# Patient Record
Sex: FEMALE | Race: WHITE | NOT HISPANIC OR LATINO | Employment: FULL TIME | URBAN - METROPOLITAN AREA
[De-identification: names, ages, dates, MRNs, and addresses within clinical notes are randomized per-mention and may not be internally consistent; named-entity substitution may affect disease eponyms.]

---

## 2022-06-22 ENCOUNTER — APPOINTMENT (OUTPATIENT)
Dept: LAB | Facility: CLINIC | Age: 22
End: 2022-06-22

## 2022-06-22 DIAGNOSIS — Z02.1 PRE-EMPLOYMENT EXAMINATION: ICD-10-CM

## 2022-06-22 LAB — RUBV IGG SERPL IA-ACNC: 171 IU/ML

## 2022-06-22 PROCEDURE — 86762 RUBELLA ANTIBODY: CPT

## 2022-06-22 PROCEDURE — 86787 VARICELLA-ZOSTER ANTIBODY: CPT

## 2022-06-22 PROCEDURE — 86735 MUMPS ANTIBODY: CPT

## 2022-06-22 PROCEDURE — 86765 RUBEOLA ANTIBODY: CPT

## 2022-06-22 PROCEDURE — 36415 COLL VENOUS BLD VENIPUNCTURE: CPT

## 2022-06-22 PROCEDURE — 86480 TB TEST CELL IMMUN MEASURE: CPT

## 2022-06-23 LAB
GAMMA INTERFERON BACKGROUND BLD IA-ACNC: 0.04 IU/ML
M TB IFN-G BLD-IMP: NEGATIVE
M TB IFN-G CD4+ BCKGRND COR BLD-ACNC: 0 IU/ML
M TB IFN-G CD4+ BCKGRND COR BLD-ACNC: 0 IU/ML
MEV IGG SER QL: NORMAL
MITOGEN IGNF BCKGRD COR BLD-ACNC: >10 IU/ML
MUV IGG SER QL: NORMAL
VZV IGG SER IA-ACNC: NORMAL

## 2022-09-18 ENCOUNTER — APPOINTMENT (EMERGENCY)
Dept: CT IMAGING | Facility: HOSPITAL | Age: 22
End: 2022-09-18
Payer: COMMERCIAL

## 2022-09-18 ENCOUNTER — HOSPITAL ENCOUNTER (EMERGENCY)
Facility: HOSPITAL | Age: 22
Discharge: HOME | End: 2022-09-18
Attending: EMERGENCY MEDICINE | Admitting: EMERGENCY MEDICINE
Payer: COMMERCIAL

## 2022-09-18 VITALS
HEIGHT: 62 IN | OXYGEN SATURATION: 100 % | DIASTOLIC BLOOD PRESSURE: 64 MMHG | BODY MASS INDEX: 22.08 KG/M2 | SYSTOLIC BLOOD PRESSURE: 118 MMHG | WEIGHT: 120 LBS | HEART RATE: 77 BPM | RESPIRATION RATE: 17 BRPM | TEMPERATURE: 98.8 F

## 2022-09-18 DIAGNOSIS — R10.84 GENERALIZED ABDOMINAL PAIN: Primary | ICD-10-CM

## 2022-09-18 LAB
ALBUMIN SERPL BCP-MCNC: 4.5 G/DL (ref 3.5–5)
ALP SERPL-CCNC: 45 U/L (ref 46–116)
ALT SERPL W P-5'-P-CCNC: 12 U/L (ref 12–78)
ANION GAP SERPL CALCULATED.3IONS-SCNC: 8 MMOL/L (ref 4–13)
AST SERPL W P-5'-P-CCNC: 19 U/L (ref 5–45)
BACTERIA UR QL AUTO: ABNORMAL /HPF
BASOPHILS # BLD AUTO: 0.03 THOUSANDS/ΜL (ref 0–0.1)
BASOPHILS NFR BLD AUTO: 1 % (ref 0–1)
BILIRUB SERPL-MCNC: 0.55 MG/DL (ref 0.2–1)
BILIRUB UR QL STRIP: NEGATIVE
BUN SERPL-MCNC: 16 MG/DL (ref 5–25)
CALCIUM SERPL-MCNC: 9.1 MG/DL (ref 8.3–10.1)
CHLORIDE SERPL-SCNC: 103 MMOL/L (ref 96–108)
CLARITY UR: ABNORMAL
CO2 SERPL-SCNC: 25 MMOL/L (ref 21–32)
COLOR UR: YELLOW
CREAT SERPL-MCNC: 0.94 MG/DL (ref 0.6–1.3)
EOSINOPHIL # BLD AUTO: 0.18 THOUSAND/ΜL (ref 0–0.61)
EOSINOPHIL NFR BLD AUTO: 3 % (ref 0–6)
ERYTHROCYTE [DISTWIDTH] IN BLOOD BY AUTOMATED COUNT: 11.8 % (ref 11.6–15.1)
EXT PREG TEST URINE: NEGATIVE
EXT. CONTROL ED NAV: NORMAL
GFR SERPL CREATININE-BSD FRML MDRD: 86 ML/MIN/1.73SQ M
GLUCOSE SERPL-MCNC: 90 MG/DL (ref 65–140)
GLUCOSE UR STRIP-MCNC: NEGATIVE MG/DL
HCT VFR BLD AUTO: 40.7 % (ref 34.8–46.1)
HGB BLD-MCNC: 13.7 G/DL (ref 11.5–15.4)
HGB UR QL STRIP.AUTO: NEGATIVE
IMM GRANULOCYTES # BLD AUTO: 0.03 THOUSAND/UL (ref 0–0.2)
IMM GRANULOCYTES NFR BLD AUTO: 1 % (ref 0–2)
KETONES UR STRIP-MCNC: ABNORMAL MG/DL
LACTATE SERPL-SCNC: 1.1 MMOL/L (ref 0.5–2)
LEUKOCYTE ESTERASE UR QL STRIP: ABNORMAL
LIPASE SERPL-CCNC: 80 U/L (ref 73–393)
LYMPHOCYTES # BLD AUTO: 2.13 THOUSANDS/ΜL (ref 0.6–4.47)
LYMPHOCYTES NFR BLD AUTO: 37 % (ref 14–44)
MCH RBC QN AUTO: 31 PG (ref 26.8–34.3)
MCHC RBC AUTO-ENTMCNC: 33.7 G/DL (ref 31.4–37.4)
MCV RBC AUTO: 92 FL (ref 82–98)
MONOCYTES # BLD AUTO: 0.56 THOUSAND/ΜL (ref 0.17–1.22)
MONOCYTES NFR BLD AUTO: 10 % (ref 4–12)
NEUTROPHILS # BLD AUTO: 2.78 THOUSANDS/ΜL (ref 1.85–7.62)
NEUTS SEG NFR BLD AUTO: 48 % (ref 43–75)
NITRITE UR QL STRIP: NEGATIVE
NON-SQ EPI CELLS URNS QL MICRO: ABNORMAL /HPF
NRBC BLD AUTO-RTO: 0 /100 WBCS
PH UR STRIP.AUTO: 6 [PH]
PLATELET # BLD AUTO: 269 THOUSANDS/UL (ref 149–390)
PMV BLD AUTO: 9.5 FL (ref 8.9–12.7)
POTASSIUM SERPL-SCNC: 3.7 MMOL/L (ref 3.5–5.3)
PROT SERPL-MCNC: 7.7 G/DL (ref 6.4–8.4)
PROT UR STRIP-MCNC: NEGATIVE MG/DL
RBC # BLD AUTO: 4.42 MILLION/UL (ref 3.81–5.12)
RBC #/AREA URNS AUTO: ABNORMAL /HPF
SODIUM SERPL-SCNC: 136 MMOL/L (ref 135–147)
SP GR UR STRIP.AUTO: >=1.03 (ref 1–1.03)
UROBILINOGEN UR QL STRIP.AUTO: 0.2 E.U./DL
WBC # BLD AUTO: 5.71 THOUSAND/UL (ref 4.31–10.16)
WBC #/AREA URNS AUTO: ABNORMAL /HPF

## 2022-09-18 PROCEDURE — 85025 COMPLETE CBC W/AUTO DIFF WBC: CPT | Performed by: EMERGENCY MEDICINE

## 2022-09-18 PROCEDURE — G1004 CDSM NDSC: HCPCS

## 2022-09-18 PROCEDURE — 81025 URINE PREGNANCY TEST: CPT | Performed by: EMERGENCY MEDICINE

## 2022-09-18 PROCEDURE — 36415 COLL VENOUS BLD VENIPUNCTURE: CPT | Performed by: EMERGENCY MEDICINE

## 2022-09-18 PROCEDURE — 81001 URINALYSIS AUTO W/SCOPE: CPT | Performed by: EMERGENCY MEDICINE

## 2022-09-18 PROCEDURE — 83605 ASSAY OF LACTIC ACID: CPT | Performed by: EMERGENCY MEDICINE

## 2022-09-18 PROCEDURE — 99284 EMERGENCY DEPT VISIT MOD MDM: CPT

## 2022-09-18 PROCEDURE — 99284 EMERGENCY DEPT VISIT MOD MDM: CPT | Performed by: EMERGENCY MEDICINE

## 2022-09-18 PROCEDURE — 83690 ASSAY OF LIPASE: CPT | Performed by: EMERGENCY MEDICINE

## 2022-09-18 PROCEDURE — 80053 COMPREHEN METABOLIC PANEL: CPT | Performed by: EMERGENCY MEDICINE

## 2022-09-18 PROCEDURE — 96360 HYDRATION IV INFUSION INIT: CPT

## 2022-09-18 PROCEDURE — 87086 URINE CULTURE/COLONY COUNT: CPT | Performed by: EMERGENCY MEDICINE

## 2022-09-18 PROCEDURE — 74177 CT ABD & PELVIS W/CONTRAST: CPT

## 2022-09-18 RX ORDER — CEPHALEXIN 250 MG/1
500 CAPSULE ORAL ONCE
Status: COMPLETED | OUTPATIENT
Start: 2022-09-18 | End: 2022-09-18

## 2022-09-18 RX ORDER — MEDROXYPROGESTERONE ACETATE 150 MG/ML
INJECTION, SUSPENSION INTRAMUSCULAR
COMMUNITY
Start: 2022-04-04

## 2022-09-18 RX ORDER — PANTOPRAZOLE SODIUM 20 MG/1
20 TABLET, DELAYED RELEASE ORAL DAILY
Qty: 20 TABLET | Refills: 0 | Status: SHIPPED | OUTPATIENT
Start: 2022-09-18

## 2022-09-18 RX ORDER — FAMOTIDINE 20 MG/1
20 TABLET, FILM COATED ORAL ONCE
Status: COMPLETED | OUTPATIENT
Start: 2022-09-18 | End: 2022-09-18

## 2022-09-18 RX ORDER — CEPHALEXIN 500 MG/1
500 CAPSULE ORAL EVERY 6 HOURS SCHEDULED
Qty: 20 CAPSULE | Refills: 0 | Status: SHIPPED | OUTPATIENT
Start: 2022-09-18 | End: 2022-09-23

## 2022-09-18 RX ADMIN — SODIUM CHLORIDE 1000 ML: 0.9 INJECTION, SOLUTION INTRAVENOUS at 14:27

## 2022-09-18 RX ADMIN — CEPHALEXIN 500 MG: 250 CAPSULE ORAL at 15:49

## 2022-09-18 RX ADMIN — FAMOTIDINE 20 MG: 20 TABLET ORAL at 15:49

## 2022-09-18 RX ADMIN — IOHEXOL 90 ML: 350 INJECTION, SOLUTION INTRAVENOUS at 15:09

## 2022-09-18 NOTE — ED PROVIDER NOTES
History  Chief Complaint   Patient presents with    Abdominal Pain     Pt c/o continuous abdominal pain w/ low grade fevers, worsens after eating for past week  Denies travel/sob/cough/n/v/d     Patient been having fairly consistent mid abdominal pain for the past 1 week  Had low-grade fevers on Saturday and the other day  No fevers now  No nausea or vomiting  Decreased appetite  Decreased p o  Intake  No dysuria frequency  Patient is on Depo  Bowel movements have been smaller in amount  No recent cough or cold symptoms  No dysuria frequency  Nothing taken for the pain  No radiation of the pain  Worse after eating  Does take a lot of Advil  History provided by:  Patient   used: No    Abdominal Pain  Pain location:  Generalized  Pain quality: aching    Pain radiates to:  Does not radiate  Pain severity:  Mild  Onset quality:  Gradual  Duration:  1 week  Timing:  Constant  Progression:  Unchanged  Chronicity:  New  Context: not awakening from sleep, not recent sexual activity and not sick contacts    Relieved by:  Nothing  Worsened by:  Eating  Ineffective treatments:  None tried  Associated symptoms: anorexia and fever    Associated symptoms: no chest pain, no chills, no constipation, no cough, no diarrhea, no dysuria, no hematuria, no nausea, no shortness of breath, no sore throat and no vomiting        Prior to Admission Medications   Prescriptions Last Dose Informant Patient Reported? Taking? medroxyPROGESTERone (DEPO-PROVERA) 150 mg/mL injection   Yes Yes      Facility-Administered Medications: None       History reviewed  No pertinent past medical history  History reviewed  No pertinent surgical history  History reviewed  No pertinent family history  I have reviewed and agree with the history as documented      E-Cigarette/Vaping    E-Cigarette Use Never User      E-Cigarette/Vaping Substances     Social History     Tobacco Use    Smoking status: Never Smoker    Smokeless tobacco: Never Used   Vaping Use    Vaping Use: Never used   Substance Use Topics    Alcohol use: Not Currently    Drug use: Never       Review of Systems   Constitutional: Positive for fever  Negative for chills  HENT: Negative for ear pain, hearing loss, sore throat, trouble swallowing and voice change  Eyes: Negative for pain and discharge  Respiratory: Negative for cough, shortness of breath and wheezing  Cardiovascular: Negative for chest pain and palpitations  Gastrointestinal: Positive for abdominal pain and anorexia  Negative for blood in stool, constipation, diarrhea, nausea and vomiting  Genitourinary: Negative for dysuria, flank pain, frequency and hematuria  Musculoskeletal: Negative for joint swelling, neck pain and neck stiffness  Skin: Negative for rash and wound  Neurological: Negative for dizziness, seizures, syncope, facial asymmetry and headaches  Psychiatric/Behavioral: Negative for hallucinations, self-injury and suicidal ideas  All other systems reviewed and are negative  Physical Exam  Physical Exam  Vitals and nursing note reviewed  Constitutional:       General: She is not in acute distress  Appearance: She is well-developed  HENT:      Head: Normocephalic and atraumatic  Right Ear: External ear normal       Left Ear: External ear normal    Eyes:      General: No scleral icterus  Right eye: No discharge  Left eye: No discharge  Extraocular Movements: Extraocular movements intact  Conjunctiva/sclera: Conjunctivae normal    Cardiovascular:      Rate and Rhythm: Normal rate and regular rhythm  Heart sounds: Normal heart sounds  No murmur heard  Pulmonary:      Effort: Pulmonary effort is normal       Breath sounds: Normal breath sounds  No wheezing or rales  Abdominal:      General: Bowel sounds are normal  There is no distension  Palpations: Abdomen is soft  Tenderness:  There is abdominal tenderness (Minimal tenderness in the mid epigastric/periumbilical region  )  There is no guarding or rebound  Musculoskeletal:         General: No deformity  Normal range of motion  Cervical back: Normal range of motion and neck supple  Skin:     General: Skin is warm and dry  Findings: No rash  Neurological:      General: No focal deficit present  Mental Status: She is alert and oriented to person, place, and time  Cranial Nerves: No cranial nerve deficit  Psychiatric:         Mood and Affect: Mood normal          Behavior: Behavior normal          Thought Content: Thought content normal          Judgment: Judgment normal          Vital Signs  ED Triage Vitals [09/18/22 1358]   Temperature Pulse Respirations Blood Pressure SpO2   98 8 °F (37 1 °C) 97 17 142/85 99 %      Temp Source Heart Rate Source Patient Position - Orthostatic VS BP Location FiO2 (%)   Temporal Monitor Sitting Right arm --      Pain Score       3           Vitals:    09/18/22 1358 09/18/22 1445   BP: 142/85 118/64   Pulse: 97 77   Patient Position - Orthostatic VS: Sitting          Visual Acuity      ED Medications  Medications   cephalexin (KEFLEX) capsule 500 mg (has no administration in time range)   famotidine (PEPCID) tablet 20 mg (has no administration in time range)   sodium chloride 0 9 % bolus 1,000 mL (1,000 mL Intravenous New Bag 9/18/22 1427)   iohexol (OMNIPAQUE) 350 MG/ML injection (SINGLE-DOSE) 90 mL (90 mL Intravenous Given 9/18/22 1509)       Diagnostic Studies  Results Reviewed     Procedure Component Value Units Date/Time    Lactic acid [[id number]]  (Normal) Collected: 09/18/22 1425    Lab Status: Final result Specimen: Blood from Line, Venous Updated: 09/18/22 1505     LACTIC ACID 1 1 mmol/L     Narrative:      Result may be elevated if tourniquet was used during collection      Urine Microscopic [[id number]]  (Abnormal) Collected: 09/18/22 1426    Lab Status: Final result Specimen: Urine, Clean Catch Updated: 09/18/22 1505     RBC, UA None Seen /hpf      WBC, UA 10-20 /hpf      Epithelial Cells Moderate /hpf      Bacteria, UA Moderate /hpf     Urine culture [[id number]] Collected: 09/18/22 1426    Lab Status:  In process Specimen: Urine, Clean Catch Updated: 09/18/22 1504    Comprehensive metabolic panel [[id number]]  (Abnormal) Collected: 09/18/22 1425    Lab Status: Final result Specimen: Blood from Line, Venous Updated: 09/18/22 1456     Sodium 136 mmol/L      Potassium 3 7 mmol/L      Chloride 103 mmol/L      CO2 25 mmol/L      ANION GAP 8 mmol/L      BUN 16 mg/dL      Creatinine 0 94 mg/dL      Glucose 90 mg/dL      Calcium 9 1 mg/dL      AST 19 U/L      ALT 12 U/L      Alkaline Phosphatase 45 U/L      Total Protein 7 7 g/dL      Albumin 4 5 g/dL      Total Bilirubin 0 55 mg/dL      eGFR 86 ml/min/1 73sq m     Narrative:      [place] guidelines for Chronic Kidney Disease (CKD):     Stage 1 with normal or high GFR (GFR > 90 mL/min/1 73 square meters)    Stage 2 Mild CKD (GFR = 60-89 mL/min/1 73 square meters)    Stage 3A Moderate CKD (GFR = 45-59 mL/min/1 73 square meters)    Stage 3B Moderate CKD (GFR = 30-44 mL/min/1 73 square meters)    Stage 4 Severe CKD (GFR = 15-29 mL/min/1 73 square meters)    Stage 5 End Stage CKD (GFR <15 mL/min/1 73 square meters)  Note: GFR calculation is accurate only with a steady state creatinine    Lipase [[id number]]  (Normal) Collected: 09/18/22 1425    Lab Status: Final result Specimen: Blood from Line, Venous Updated: 09/18/22 1456     Lipase 80 u/L     UA w Reflex to Microscopic w Reflex to Culture [[id number]]  (Abnormal) Collected: 09/18/22 1426    Lab Status: Final result Specimen: Urine, Clean Catch Updated: 09/18/22 1448     Color, UA Yellow     Clarity, UA Slightly Cloudy     Specific Gravity, UA >=1 030     pH, UA 6 0     Leukocytes, UA Trace     Nitrite, UA Negative     Protein, UA Negative mg/dl      Glucose, UA Negative mg/dl Ketones, UA Trace mg/dl      Urobilinogen, UA 0 2 E U /dl      Bilirubin, UA Negative     Occult Blood, UA Negative    CBC and differential [[id number]] Collected: 09/18/22 1425    Lab Status: Final result Specimen: Blood from Line, Venous Updated: 09/18/22 1437     WBC 5 71 Thousand/uL      RBC 4 42 Million/uL      Hemoglobin 13 7 g/dL      Hematocrit 40 7 %      MCV 92 fL      MCH 31 0 pg      MCHC 33 7 g/dL      RDW 11 8 %      MPV 9 5 fL      Platelets 650 Thousands/uL      nRBC 0 /100 WBCs      Neutrophils Relative 48 %      Immat GRANS % 1 %      Lymphocytes Relative 37 %      Monocytes Relative 10 %      Eosinophils Relative 3 %      Basophils Relative 1 %      Neutrophils Absolute 2 78 Thousands/µL      Immature Grans Absolute 0 03 Thousand/uL      Lymphocytes Absolute 2 13 Thousands/µL      Monocytes Absolute 0 56 Thousand/µL      Eosinophils Absolute 0 18 Thousand/µL      Basophils Absolute 0 03 Thousands/µL     POCT pregnancy, urine [[id number]]  (Normal) Resulted: 09/18/22 1427    Lab Status: Final result Updated: 09/18/22 1428     EXT PREG TEST UR (Ref: Negative) NEGATIVE     Control VALID                 CT abdomen pelvis with contrast   Final Result by Halbert Kawasaki, MD (09/18 1531)      Mural thickening of the duodenum and proximal jejunum, consistent with enteritis        Otherwise unremarkable CT of the abdomen and pelvis            Workstation performed: [id number]                    Procedures  Procedures         ED Course                                             MDM  Number of Diagnoses or Management Options     Amount and/or Complexity of Data Reviewed  Clinical lab tests: reviewed  Review and summarize past medical records: yes        Disposition  Final diagnoses:   Generalized abdominal pain     Time reflects when diagnosis was documented in both MDM as applicable and the Disposition within this note     Time User Action Codes Description Comment    9/18/2022  3:36 PM Daryl Maribel Benton Add [I41 03] Generalized abdominal pain       ED Disposition     ED Disposition   Discharge    Condition   Stable    Date/Time   Sun Sep 18, 2022  3:36 PM    Comment   Denys Encinas discharge to home/self care  Follow-up Information    None         Patient's Medications   Discharge Prescriptions    CEPHALEXIN (KEFLEX) 500 MG CAPSULE    Take 1 capsule (500 mg total) by mouth every 6 (six) hours for 5 days       Start Date: 9/18/2022 End Date: 9/23/2022       Order Dose: 500 mg       Quantity: 20 capsule    Refills: 0    PANTOPRAZOLE (PROTONIX) 20 MG TABLET    Take 1 tablet (20 mg total) by mouth daily       Start Date: 9/18/2022 End Date: --       Order Dose: 20 mg       Quantity: 20 tablet    Refills: 0       No discharge procedures on file      PDMP Review     None          ED Provider  Electronically Signed by           Macarena Go MD  09/18/22 350 [id number]

## 2022-09-20 LAB — BACTERIA UR CULT: NORMAL

## 2022-10-15 ENCOUNTER — OFFICE VISIT (OUTPATIENT)
Dept: URGENT CARE | Facility: CLINIC | Age: 22
End: 2022-10-15
Payer: COMMERCIAL

## 2022-10-15 VITALS
DIASTOLIC BLOOD PRESSURE: 66 MMHG | SYSTOLIC BLOOD PRESSURE: 106 MMHG | OXYGEN SATURATION: 98 % | BODY MASS INDEX: 21.35 KG/M2 | RESPIRATION RATE: 16 BRPM | TEMPERATURE: 98 F | WEIGHT: 116 LBS | HEIGHT: 62 IN | HEART RATE: 88 BPM

## 2022-10-15 DIAGNOSIS — R05.1 ACUTE COUGH: Primary | ICD-10-CM

## 2022-10-15 LAB
S PYO AG THROAT QL: NEGATIVE
SARS-COV-2 AG UPPER RESP QL IA: NEGATIVE
VALID CONTROL: NORMAL

## 2022-10-15 PROCEDURE — 87880 STREP A ASSAY W/OPTIC: CPT | Performed by: NURSE PRACTITIONER

## 2022-10-15 PROCEDURE — 99213 OFFICE O/P EST LOW 20 MIN: CPT | Performed by: NURSE PRACTITIONER

## 2022-10-15 PROCEDURE — 87070 CULTURE OTHR SPECIMN AEROBIC: CPT | Performed by: NURSE PRACTITIONER

## 2022-10-15 PROCEDURE — 87811 SARS-COV-2 COVID19 W/OPTIC: CPT | Performed by: NURSE PRACTITIONER

## 2022-10-15 RX ORDER — BENZONATATE 200 MG/1
200 CAPSULE ORAL 3 TIMES DAILY PRN
Qty: 20 CAPSULE | Refills: 0 | Status: SHIPPED | OUTPATIENT
Start: 2022-10-15

## 2022-10-15 NOTE — PATIENT INSTRUCTIONS
Take zyrtec or allegra daily  Use flonase 1-2 sprays in each nare daily   Use nasal saline to the nose,   Use humidifer in room  Symptoms worsen go to ER  Rest      Rapid covid was negative  Follow up with pcp  Take meds as directed   Warm salt gargles  Increase fluids    Take zyrtec or allegra for symptoms along with flonase    Rapid strep was negative today and sent for a culture  Call in 2-3 days for throat culture results    Take tylenol and motrin for fever and pain     No bacterial infection noted

## 2022-10-15 NOTE — PROGRESS NOTES
NAME: Jeff Chavez is a 24 y o  female  : 2000    MRN: [id number]    /66   Pulse 88   Temp 98 °F (36 7 °C)   Resp 16   Ht 5' 2" (1 575 m)   Wt 52 6 kg (116 lb)   SpO2 98%   BMI 21 22 kg/m²     Assessment and Plan   Acute cough [R05 1]  1  Acute cough  Poct Covid 19 Rapid Antigen Test    POCT rapid strepA    Throat culture    benzonatate (TESSALON) 200 MG capsule       Jason Raya was seen today for cough  Diagnoses and all orders for this visit:    Acute cough  -     Poct Covid 19 Rapid Antigen Test  -     POCT rapid strepA  -     Throat culture  -     benzonatate (TESSALON) 200 MG capsule; Take 1 capsule (200 mg total) by mouth 3 (three) times a day as needed for cough     Rapid strep is negative, rapid covid is negative    Patient Instructions   Patient Instructions   Take zyrtec or allegra daily  Use flonase 1-2 sprays in each nare daily   Use nasal saline to the nose,   Use humidifer in room  Symptoms worsen go to ER  Rest      Rapid covid was negative  Follow up with pcp  Take meds as directed   Warm salt gargles  Increase fluids    Take zyrtec or allegra for symptoms along with flonase    Rapid strep was negative today and sent for a culture  Call in 2-3 days for throat culture results  Take tylenol and motrin for fever and pain     No bacterial infection noted       Proceed to the nearest ER if symptoms worsen, Follow up with your PCP  Continue to social distance, wash your hands, and wear your masks  Please continue to follow the CDC  gov guidelines daily for they are subject to change on COVID-19    Chief Complaint     Chief Complaint   Patient presents with   • Cough     Cough, sob, sore throat and stuffiness x 3 days         History of Present Illness     Pt has had cough and SOB along with sore throat and stuffy nose for the past three days  She is currently taking OTC meds with little relief, hasn't had home covid test today   She is covid 19 vaccinated and has covid in the past  She has no seasonal allergies that she is aware of  She has some ear discomfort intermittently  Review of Systems   Review of Systems   Constitutional: Negative for chills, fatigue and fever  HENT: Positive for congestion, postnasal drip, rhinorrhea, sinus pressure, sinus pain and sore throat  Eyes: Negative  Respiratory: Positive for cough  Cardiovascular: Negative  Gastrointestinal: Negative  Genitourinary: Negative  Musculoskeletal: Negative  Neurological: Negative  Psychiatric/Behavioral: Negative  Current Medications       Current Outpatient Medications:   •  benzonatate (TESSALON) 200 MG capsule, Take 1 capsule (200 mg total) by mouth 3 (three) times a day as needed for cough, Disp: 20 capsule, Rfl: 0  •  medroxyPROGESTERone (DEPO-PROVERA) 150 mg/mL injection, , Disp: , Rfl:   •  pantoprazole (PROTONIX) 20 mg tablet, Take 1 tablet (20 mg total) by mouth daily (Patient not taking: Reported on 10/15/2022), Disp: 20 tablet, Rfl: 0    Current Allergies     Allergies as of 10/15/2022   • (No Known Allergies)              Past Medical History:   Diagnosis Date   • Known health problems: none        Past Surgical History:   Procedure Laterality Date   • BUNIONECTOMY Bilateral        Family History   Problem Relation Age of Onset   • No Known Problems Mother    • No Known Problems Father          Medications have been verified  The following portions of the patient's history were reviewed and updated as appropriate: allergies, current medications, past family history, past medical history, past social history, past surgical history and problem list     Objective   /66   Pulse 88   Temp 98 °F (36 7 °C)   Resp 16   Ht 5' 2" (1 575 m)   Wt 52 6 kg (116 lb)   SpO2 98%   BMI 21 22 kg/m²      Physical Exam     Physical Exam  Constitutional:       General: She is not in acute distress  Appearance: Normal appearance  She is not ill-appearing     HENT:      Head: Normocephalic  Right Ear: Tympanic membrane, ear canal and external ear normal  There is no impacted cerumen  Left Ear: Tympanic membrane, ear canal and external ear normal  There is no impacted cerumen  Nose: Nose normal  No congestion or rhinorrhea  Mouth/Throat:      Mouth: Mucous membranes are moist       Pharynx: No oropharyngeal exudate or posterior oropharyngeal erythema  Eyes:      Pupils: Pupils are equal, round, and reactive to light  Cardiovascular:      Rate and Rhythm: Normal rate and regular rhythm  Pulses: Normal pulses  Heart sounds: Normal heart sounds  No murmur heard  Pulmonary:      Effort: Pulmonary effort is normal  No respiratory distress  Breath sounds: Normal breath sounds  No stridor  No wheezing or rales  Musculoskeletal:         General: Normal range of motion  Cervical back: Normal range of motion  Skin:     General: Skin is warm  Capillary Refill: Capillary refill takes less than 2 seconds  Neurological:      General: No focal deficit present  Mental Status: She is alert and oriented to person, place, and time  Psychiatric:         Mood and Affect: Mood normal                Note: Portions of this record may have been created with voice recognition software  Occasional wrong word or "sound a like" substitutions may have occurred due to the inherent limitations of voice recognition software  Please read the chart carefully and recognize, using context, where substitutions have occurred  Alondra Amado

## 2022-10-16 LAB — BACTERIA THROAT CULT: NORMAL

## 2022-10-18 LAB — BACTERIA THROAT CULT: NORMAL

## 2022-11-04 ENCOUNTER — APPOINTMENT (OUTPATIENT)
Dept: RADIOLOGY | Facility: CLINIC | Age: 22
End: 2022-11-04

## 2022-11-04 ENCOUNTER — OFFICE VISIT (OUTPATIENT)
Dept: FAMILY MEDICINE CLINIC | Facility: CLINIC | Age: 22
End: 2022-11-04

## 2022-11-04 VITALS
TEMPERATURE: 98.4 F | DIASTOLIC BLOOD PRESSURE: 60 MMHG | OXYGEN SATURATION: 98 % | BODY MASS INDEX: 21.75 KG/M2 | HEART RATE: 89 BPM | SYSTOLIC BLOOD PRESSURE: 90 MMHG | HEIGHT: 62 IN | WEIGHT: 118.2 LBS

## 2022-11-04 DIAGNOSIS — R11.0 NAUSEA: ICD-10-CM

## 2022-11-04 DIAGNOSIS — R10.12 LEFT UPPER QUADRANT ABDOMINAL PAIN: ICD-10-CM

## 2022-11-04 DIAGNOSIS — Z23 NEEDS FLU SHOT: ICD-10-CM

## 2022-11-04 DIAGNOSIS — R51.9 FREQUENT HEADACHES: ICD-10-CM

## 2022-11-04 DIAGNOSIS — K59.00 CONSTIPATION, UNSPECIFIED CONSTIPATION TYPE: ICD-10-CM

## 2022-11-04 DIAGNOSIS — G43.809 OTHER MIGRAINE WITHOUT STATUS MIGRAINOSUS, NOT INTRACTABLE: ICD-10-CM

## 2022-11-04 DIAGNOSIS — Z00.00 ANNUAL PHYSICAL EXAM: Primary | ICD-10-CM

## 2022-11-04 RX ORDER — PROPRANOLOL HYDROCHLORIDE 10 MG/1
10 TABLET ORAL DAILY
Qty: 30 TABLET | Refills: 0 | Status: SHIPPED | OUTPATIENT
Start: 2022-11-04

## 2022-11-04 RX ORDER — SUCRALFATE ORAL 1 G/10ML
1 SUSPENSION ORAL
Qty: 414 ML | Refills: 0 | Status: SHIPPED | OUTPATIENT
Start: 2022-11-04

## 2022-11-04 RX ORDER — HYDROXYZINE HYDROCHLORIDE 10 MG/1
TABLET, FILM COATED ORAL
COMMUNITY

## 2022-11-04 NOTE — PROGRESS NOTES
ADULT ANNUAL [facility] PRIMARY CARE    NAME: Fabiola Forbes  AGE: 24 y o  SEX: female  : 2000     DATE: 2022     Assessment and Plan:     Problem List Items Addressed This Visit    None     Visit Diagnoses     Annual physical exam    -  Primary    Relevant Orders    Comprehensive metabolic panel    Frequent headaches        Relevant Medications    propranolol (INDERAL) 10 mg tablet    Needs flu shot        Relevant Orders    influenza vaccine, quadrivalent, 0 5 mL, preservative-free, for adult and pediatric patients 6 mos+ (AFLURIA, FLUARIX, FLULAVAL, FLUZONE)    Other migraine without status migrainosus, not intractable        Relevant Medications    propranolol (INDERAL) 10 mg tablet    Nausea        Relevant Medications    sucralfate (CARAFATE) 1 g/10 mL suspension    Other Relevant Orders    Comprehensive metabolic panel    Left upper quadrant abdominal pain        Relevant Medications    sucralfate (CARAFATE) 1 g/10 mL suspension    Other Relevant Orders    Comprehensive metabolic panel    Constipation, unspecified constipation type        Relevant Orders    XR abdomen 1 view kub          Immunizations and preventive care screenings were discussed with patient today  Appropriate education was printed on patient's after visit summary  Counseling:  · Dental Health: discussed importance of regular tooth brushing, flossing, and dental visits  Return in about 4 weeks (around 2022)  Chief Complaint:     Chief Complaint   Patient presents with   • Physical Exam     New pt   • Headache     Daily headaches for years  • stomach  issues     Stomach ache after eating with nausea  • Constipation      History of Present Illness:     Adult Annual Physical   Patient here for a comprehensive physical exam  The patient reports problems - see additional note  Diet and Physical Activity  · Diet/Nutrition: well balanced diet  · Exercise: no formal exercise  Depression Screening  PHQ-2/9 Depression Screening    Little interest or pleasure in doing things: 0 - not at all  Feeling down, depressed, or hopeless: 0 - not at all       [id number] [facility]  · Sleep: sleeps well  · Hearing: normal - bilateral   · Vision: no vision problems  · Dental: regular dental visits  /GYN Health  · Last menstrual period: n/a - on depo injection  · Contraceptive method: injectable contraception  · History of STDs?: no      Review of Systems:     Review of Systems     Review of Systems   Constitutional: Negative for fever  Gastrointestinal: Positive for abdominal pain, anorexia, constipation, flatus and nausea  Negative for diarrhea, hematochezia, melena and vomiting  Genitourinary: Negative for dysuria, frequency and hematuria  Musculoskeletal: Negative for arthralgias and myalgias  Neurological: Positive for headaches          Past Medical History:     Past Medical History:   Diagnosis Date   • Known health problems: none       Past Surgical History:     Past Surgical History:   Procedure Laterality Date   • BUNIONECTOMY Bilateral       Social History:     Social History     Socioeconomic History   • Marital status: Single     Spouse name: None   • Number of children: None   • Years of education: None   • Highest education level: None   Occupational History   • None   Tobacco Use   • Smoking status: Never Smoker   • Smokeless tobacco: Never Used   Vaping Use   • Vaping Use: Never used   Substance and Sexual Activity   • Alcohol use: Not Currently     Comment: occational   • Drug use: Never   • Sexual activity: Not Currently   Other Topics Concern   • None   Social History Narrative   • None     Social Determinants of Health     Financial Resource Strain: Not on file   Food Insecurity: Not on file   Transportation Needs: Not on file   Physical Activity: Not on file   Stress: Not on file   Social Connections: Not on file   Intimate Partner Violence: Not on file   Housing Stability: Not on file      Family History:     Family History   Problem Relation Age of Onset   • No Known Problems Mother    • No Known Problems Father       Current Medications:     Current Outpatient Medications   Medication Sig Dispense Refill   • medroxyPROGESTERone (DEPO-PROVERA) 150 mg/mL injection      • propranolol (INDERAL) 10 mg tablet Take 1 tablet (10 mg total) by mouth in the morning 30 tablet 0   • sucralfate (CARAFATE) 1 g/10 mL suspension Take 10 mL (1 g total) by mouth 3 (three) times a day before meals 414 mL 0   • hydrOXYzine HCL (ATARAX) 10 mg tablet      • pantoprazole (PROTONIX) 20 mg tablet Take 1 tablet (20 mg total) by mouth daily (Patient not taking: No sig reported) 20 tablet 0     No current facility-administered medications for this visit  Allergies: Allergies   Allergen Reactions   • Shellfish-Derived Products - Food Allergy Other (See Comments)     Scratchy throat      Physical Exam:     BP 90/60 (BP Location: Left arm, Patient Position: Sitting, Cuff Size: Adult)   Pulse 89   Temp 98 4 °F (36 9 °C)   Ht 5' 2" (1 575 m)   Wt 53 6 kg (118 lb 3 2 oz)   SpO2 98%   BMI 21 62 kg/m²     Physical Exam       Physical Exam  Vitals and nursing note reviewed  Constitutional:       Appearance: Normal appearance  She is normal weight  HENT:      Head: Normocephalic and atraumatic  Right Ear: Tympanic membrane, ear canal and external ear normal       Left Ear: Tympanic membrane, ear canal and external ear normal    Eyes:      Extraocular Movements: Extraocular movements intact  Conjunctiva/sclera: Conjunctivae normal       Pupils: Pupils are equal, round, and reactive to light  Cardiovascular:      Rate and Rhythm: Normal rate and regular rhythm  Heart sounds: Normal heart sounds  No murmur heard  No gallop  Pulmonary:      Effort: Pulmonary effort is normal  No respiratory distress        Breath sounds: Normal breath sounds  No wheezing or rales  Abdominal:      General: Abdomen is flat  Bowel sounds are normal  There is no distension  Palpations: Abdomen is soft  Musculoskeletal:         General: Normal range of motion  Cervical back: Neck supple  Skin:     General: Skin is warm and dry  Neurological:      General: No focal deficit present  Mental Status: She is alert and oriented to person, place, and time  Mental status is at baseline  Cranial Nerves: No cranial nerve deficit  Psychiatric:         Mood and Affect: Mood normal          Behavior: Behavior normal          Thought Content: Thought content normal          Judgment: Judgment normal          [place] [address] [facility]  Answers for HPI/ROS submitted by the patient on 11/1/2022  Chronicity: recurrent  Onset: 1 to 4 weeks ago  Onset quality: sudden  Frequency: 2 to 4 times per day  Progression since onset: gradually worsening  Pain location: generalized abdominal region  Pain - numeric: 5/10  Pain quality: burning, cramping, dull  Radiates to: LUQ, back  anorexia: Yes  belching: Yes  flatus: Yes  hematochezia: No  melena:  No  weight loss: No  Aggravated by: certain positions, eating, movement  Relieved by: nothing  Diagnostic workup: CT scan

## 2022-11-04 NOTE — PATIENT INSTRUCTIONS

## 2022-11-04 NOTE — PROGRESS NOTES
Name: Murphy Solis      : 2000      MRN: [id number]  Encounter Provider: HARMAN Benton  Encounter Date: 2022   Encounter department: [address] [place] [address] PRIMARY CARE    Assessment & Plan     1  Annual physical exam  -     Comprehensive metabolic panel; Future    2  Frequent headaches  -     propranolol (INDERAL) 10 mg tablet; Take 1 tablet (10 mg total) by mouth in the morning    3  Needs flu shot  -     influenza vaccine, quadrivalent, 0 5 mL, preservative-free, for adult and pediatric patients 6 mos+ (AFLURIA, FLUARIX, FLULAVAL, FLUZONE)    4  Other migraine without status migrainosus, not intractable  -     propranolol (INDERAL) 10 mg tablet; Take 1 tablet (10 mg total) by mouth in the morning    5  Nausea  -     sucralfate (CARAFATE) 1 g/10 mL suspension; Take 10 mL (1 g total) by mouth 3 (three) times a day before meals  -     Comprehensive metabolic panel; Future    6  Left upper quadrant abdominal pain  -     sucralfate (CARAFATE) 1 g/10 mL suspension; Take 10 mL (1 g total) by mouth 3 (three) times a day before meals  -     Comprehensive metabolic panel; Future    7  Constipation, unspecified constipation type  -     XR abdomen 1 view kub; Future; Expected date: 2022    Will begin with daily medication for her headaches  She is with a hx of low BP so will try inderal as her heart rate is averaging in the 80's  Will begin with Carafate at meals to help with post-prandial nausea/vomiting  Abd XR ordered to assess constipation status  Review of films shows mild amount of stool - will have staff connect with her to recommend miralax daily with stool softener until results  Subjective      Here today to establish care - hx of headaches that occur daily for about 5 years  Hx of allergies - gets injections - and was told it was due to them  Reports she has taken OTC medication with no improvement    Notes headaches are daily and she reports they occur in different areas of her head at times  She takes Motrin with some relief  Reports about four times a month the headaches transition into migraines  Hx of vomiting after eating - occurring more often recently with recently starting with left upper quadrant pain  Also notes of constipation at intervals that has worsened in the past two weeks  Headache  Constipation  Associated symptoms include abdominal pain, anorexia, flatus and nausea  Pertinent negatives include no diarrhea, fever, hematochezia, melena or vomiting  Abdominal Pain  This is a recurrent problem  The current episode started 1 to 4 weeks ago  The onset quality is sudden  The problem occurs 2 to 4 times per day  The problem has been gradually worsening since onset  The pain is located in the generalized abdominal region  The pain is at a severity of 5/10  The quality of the pain is described as burning, cramping and dull  Associated symptoms include anorexia, belching, constipation, flatus, headaches and nausea  Pertinent negatives include no arthralgias, diarrhea, dysuria, fever, frequency, hematochezia, hematuria, melena, myalgias or vomiting  The symptoms are aggravated by certain positions, eating and movement  Nothing relieves the symptoms  Prior diagnostic workup includes CT scan  Review of Systems   Constitutional: Negative for fever  Gastrointestinal: Positive for abdominal pain, anorexia, constipation, flatus and nausea  Negative for diarrhea, hematochezia, melena and vomiting  Genitourinary: Negative for dysuria, frequency and hematuria  Musculoskeletal: Negative for arthralgias and myalgias  Neurological: Positive for headaches         Current Outpatient Medications on File Prior to Visit   Medication Sig   • medroxyPROGESTERone (DEPO-PROVERA) 150 mg/mL injection    • hydrOXYzine HCL (ATARAX) 10 mg tablet    • pantoprazole (PROTONIX) 20 mg tablet Take 1 tablet (20 mg total) by mouth daily (Patient not taking: No sig reported)   • [DISCONTINUED] benzonatate (TESSALON) 200 MG capsule Take 1 capsule (200 mg total) by mouth 3 (three) times a day as needed for cough (Patient not taking: Reported on 11/4/2022)       Objective     BP 90/60 (BP Location: Left arm, Patient Position: Sitting, Cuff Size: Adult)   Pulse 89   Temp 98 4 °F (36 9 °C)   Ht 5' 2" (1 575 m)   Wt 53 6 kg (118 lb 3 2 oz)   SpO2 98%   BMI 21 62 kg/m²     Physical Exam  Vitals and nursing note reviewed  Constitutional:       Appearance: Normal appearance  She is normal weight  HENT:      Head: Normocephalic and atraumatic  Right Ear: Tympanic membrane, ear canal and external ear normal       Left Ear: Tympanic membrane, ear canal and external ear normal    Eyes:      Extraocular Movements: Extraocular movements intact  Conjunctiva/sclera: Conjunctivae normal       Pupils: Pupils are equal, round, and reactive to light  Cardiovascular:      Rate and Rhythm: Normal rate and regular rhythm  Heart sounds: Normal heart sounds  No murmur heard  No gallop  Pulmonary:      Effort: Pulmonary effort is normal  No respiratory distress  Breath sounds: Normal breath sounds  No wheezing or rales  Abdominal:      General: Abdomen is flat  Bowel sounds are normal  There is no distension  Palpations: Abdomen is soft  Musculoskeletal:         General: Normal range of motion  Cervical back: Neck supple  Skin:     General: Skin is warm and dry  Neurological:      General: No focal deficit present  Mental Status: She is alert and oriented to person, place, and time  Mental status is at baseline  Cranial Nerves: No cranial nerve deficit  Psychiatric:         Mood and Affect: Mood normal          Behavior: Behavior normal          Thought Content:  Thought content normal          Judgment: Judgment normal        HARMAN Rogers  Answers for HPI/ROS submitted by the patient on 11/1/2022  Radiates to: LUQ, back  weight loss:  No

## 2022-11-23 ENCOUNTER — APPOINTMENT (OUTPATIENT)
Dept: LAB | Facility: CLINIC | Age: 22
End: 2022-11-23

## 2022-12-07 ENCOUNTER — OFFICE VISIT (OUTPATIENT)
Dept: URGENT CARE | Facility: CLINIC | Age: 22
End: 2022-12-07

## 2022-12-07 VITALS
BODY MASS INDEX: 21.49 KG/M2 | HEIGHT: 62 IN | TEMPERATURE: 97.7 F | WEIGHT: 116.8 LBS | RESPIRATION RATE: 18 BRPM | SYSTOLIC BLOOD PRESSURE: 131 MMHG | DIASTOLIC BLOOD PRESSURE: 83 MMHG | HEART RATE: 73 BPM | OXYGEN SATURATION: 99 %

## 2022-12-07 DIAGNOSIS — K59.00 CONSTIPATION, UNSPECIFIED CONSTIPATION TYPE: Primary | ICD-10-CM

## 2022-12-07 DIAGNOSIS — R11.0 NAUSEA: ICD-10-CM

## 2022-12-07 DIAGNOSIS — R10.30 LOWER ABDOMINAL PAIN: ICD-10-CM

## 2022-12-07 LAB
SL AMB  POCT GLUCOSE, UA: NEGATIVE
SL AMB LEUKOCYTE ESTERASE,UA: NEGATIVE
SL AMB POCT BILIRUBIN,UA: NEGATIVE
SL AMB POCT BLOOD,UA: NEGATIVE
SL AMB POCT CLARITY,UA: CLEAR
SL AMB POCT COLOR,UA: YELLOW
SL AMB POCT KETONES,UA: NEGATIVE
SL AMB POCT NITRITE,UA: NEGATIVE
SL AMB POCT PH,UA: 8
SL AMB POCT SPECIFIC GRAVITY,UA: 1
SL AMB POCT URINE HCG: NEGATIVE
SL AMB POCT URINE PROTEIN: NEGATIVE
SL AMB POCT UROBILINOGEN: NORMAL

## 2022-12-07 NOTE — PROGRESS NOTES
[facility]        NAME: Rachael Lemus is a 20 y o  female  : 2000    MRN: [id number]  DATE: 2022  TIME: 3:52 PM    Assessment and Plan   Constipation, unspecified constipation type [K59 00]  1  Constipation, unspecified constipation type        2  Lower abdominal pain  POCT urine dip    POCT urine HCG      3  Nausea              Patient Instructions   MiraLax  Laxative  Prune juice  Exercise  Increase fluid intake  Monitor symptoms  Follow up with PCP in 3-5 days  Proceed to  ER if symptoms worsen  Chief Complaint     Chief Complaint   Patient presents with   • Possible UTI     Patient reports lower back pain and lower abd pain with nausea for the past 2 days  History of Present Illness       Patient is a 23-year-old female with no significant past medical history presents the office complaining of bilateral low back pain, midline lower abdominal pain, and nausea for 2 days  Admits to urinary urgency but denies dysuria, urinary frequency, vaginal discharge, or hematuria  She did at home over-the-counter urinary tract infection test which showed a positive  States she is already being treated and monitored by her PCP for ongoing abdominal pain and nausea  She was placed on Carafate which has not helped  States she has not taken MiraLax because she has not had time to go to the store to get it  Reports she has a bowel movement approximately every 3 days that is hard  Reports she has a follow-up appointment with her PCP next week  Review of Systems   Review of Systems   Constitutional: Negative for chills and fever  Gastrointestinal: Positive for abdominal pain and nausea  Negative for diarrhea and vomiting  Genitourinary: Positive for urgency  Negative for decreased urine volume, difficulty urinating, dysuria, enuresis, flank pain, frequency, hematuria, pelvic pain, vaginal bleeding, vaginal discharge and vaginal pain     Musculoskeletal: Positive for back pain  Skin: Negative for rash  Current Medications       Current Outpatient Medications:   •  hydrOXYzine HCL (ATARAX) 10 mg tablet, , Disp: , Rfl:   •  medroxyPROGESTERone (DEPO-PROVERA) 150 mg/mL injection, , Disp: , Rfl:   •  pantoprazole (PROTONIX) 20 mg tablet, Take 1 tablet (20 mg total) by mouth daily, Disp: 20 tablet, Rfl: 0  •  sucralfate (CARAFATE) 1 g/10 mL suspension, Take 10 mL (1 g total) by mouth 3 (three) times a day before meals, Disp: 414 mL, Rfl: 0  •  propranolol (INDERAL) 10 mg tablet, Take 1 tablet (10 mg total) by mouth in the morning (Patient not taking: Reported on 12/7/2022), Disp: 30 tablet, Rfl: 0    Current Allergies     Allergies as of 12/07/2022 - Reviewed 12/07/2022   Allergen Reaction Noted   • Shellfish-derived products - food allergy Other (See Comments) 11/04/2022            The following portions of the patient's history were reviewed and updated as appropriate: allergies, current medications, past family history, past medical history, past social history, past surgical history and problem list      Past Medical History:   Diagnosis Date   • Known health problems: none        Past Surgical History:   Procedure Laterality Date   • BUNIONECTOMY Bilateral        Family History   Problem Relation Age of Onset   • No Known Problems Mother    • No Known Problems Father          Medications have been verified  Objective   /83   Pulse 73   Temp 97 7 °F (36 5 °C)   Resp 18   Ht 5' 2" (1 575 m)   Wt 53 kg (116 lb 12 8 oz)   SpO2 99%   BMI 21 36 kg/m²   No LMP recorded  (Menstrual status: Birth Control)  Physical Exam     Physical Exam  Vitals and nursing note reviewed  Constitutional:       Appearance: Normal appearance  She is well-developed  HENT:      Head: Normocephalic and atraumatic        Right Ear: External ear normal       Left Ear: External ear normal       Nose: Nose normal    Eyes:      General: Lids are normal    Cardiovascular: Rate and Rhythm: Normal rate and regular rhythm  Pulses: Normal pulses  Heart sounds: Normal heart sounds  No murmur heard  No friction rub  No gallop  Pulmonary:      Effort: Pulmonary effort is normal       Breath sounds: Normal breath sounds  No wheezing, rhonchi or rales  Chest:      Chest wall: No tenderness  Abdominal:      General: Bowel sounds are normal       Palpations: Abdomen is soft  Tenderness: There is no abdominal tenderness  There is no right CVA tenderness or left CVA tenderness  Musculoskeletal:         General: Normal range of motion  Lymphadenopathy:      Cervical: No cervical adenopathy  Skin:     General: Skin is warm and dry  Capillary Refill: Capillary refill takes less than 2 seconds  Findings: No rash  Neurological:      Mental Status: She is alert           POC urine preg: negative    Urine dip:    LEUKOCYTE ESTERASE,UA negative    NITRITE,UA negative    SL AMB POCT UROBILINOGEN normal    POCT URINE PROTEIN negative     PH,UA 8 0    BLOOD,UA negative    SPECIFIC GRAVITY,UA 1 005    KETONES,UA negative    BILIRUBIN,UA negative    GLUCOSE, UA negative     COLOR,UA yellow    CLARITY,UA clear

## 2022-12-16 ENCOUNTER — OFFICE VISIT (OUTPATIENT)
Dept: FAMILY MEDICINE CLINIC | Facility: CLINIC | Age: 22
End: 2022-12-16

## 2022-12-16 VITALS
DIASTOLIC BLOOD PRESSURE: 76 MMHG | BODY MASS INDEX: 21.23 KG/M2 | WEIGHT: 115.4 LBS | SYSTOLIC BLOOD PRESSURE: 95 MMHG | TEMPERATURE: 98 F | HEART RATE: 79 BPM | HEIGHT: 62 IN | OXYGEN SATURATION: 98 %

## 2022-12-16 DIAGNOSIS — R10.84 GENERALIZED ABDOMINAL PAIN: ICD-10-CM

## 2022-12-16 DIAGNOSIS — R11.0 NAUSEA: Primary | ICD-10-CM

## 2022-12-16 DIAGNOSIS — R51.9 NONINTRACTABLE HEADACHE, UNSPECIFIED CHRONICITY PATTERN, UNSPECIFIED HEADACHE TYPE: ICD-10-CM

## 2022-12-16 RX ORDER — PANTOPRAZOLE SODIUM 20 MG/1
20 TABLET, DELAYED RELEASE ORAL DAILY
Qty: 30 TABLET | Refills: 2 | Status: SHIPPED | OUTPATIENT
Start: 2022-12-16

## 2022-12-16 RX ORDER — FAMOTIDINE 20 MG/1
20 TABLET, FILM COATED ORAL EVERY EVENING
Qty: 30 TABLET | Refills: 2 | Status: SHIPPED | OUTPATIENT
Start: 2022-12-16

## 2022-12-16 NOTE — PROGRESS NOTES
Name: Marycruz Pettit      : 2000      MRN: [id number]  Encounter Provider: HARMAN Latif  Encounter Date: 2022   Encounter department: [address] [place] [address],[address]     1  Nausea  -     pantoprazole (PROTONIX) 20 mg tablet; Take 1 tablet (20 mg total) by mouth daily  -     famotidine (PEPCID) 20 mg tablet; Take 1 tablet (20 mg total) by mouth every evening    2  Generalized abdominal pain  -     pantoprazole (PROTONIX) 20 mg tablet; Take 1 tablet (20 mg total) by mouth daily  -     famotidine (PEPCID) 20 mg tablet; Take 1 tablet (20 mg total) by mouth every evening    3  Nonintractable headache, unspecified chronicity pattern, unspecified headache type    Will have her restart her pantoprazole in the AM and famotidine in the PM   Suspect some of the nausea is stress related at this time  Continue with propanolol for migraine management  Subjective      Here today for a follow-up  Reports she no longer has abdominal pain but continues with nausea at intermittent times of the day  Reports of some stress right now with retaking her NCLEX  Notes headaches are controlled at present with daily medication  Nausea  Associated symptoms include nausea  Pertinent negatives include no abdominal pain or headaches  Review of Systems   Gastrointestinal: Positive for nausea  Negative for abdominal pain  Neurological: Negative for headaches  All other systems reviewed and are negative        Current Outpatient Medications on File Prior to Visit   Medication Sig   • hydrOXYzine HCL (ATARAX) 10 mg tablet    • medroxyPROGESTERone (DEPO-PROVERA) 150 mg/mL injection    • propranolol (INDERAL) 10 mg tablet Take 1 tablet (10 mg total) by mouth in the morning   • sucralfate (CARAFATE) 1 g/10 mL suspension Take 10 mL (1 g total) by mouth 3 (three) times a day before meals   • [DISCONTINUED] pantoprazole (PROTONIX) 20 mg tablet Take 1 tablet (20 mg total) by mouth daily (Patient not taking: Reported on 12/16/2022)       Objective     BP 95/76 (BP Location: Right arm, Patient Position: Sitting, Cuff Size: Adult)   Pulse 79   Temp 98 °F (36 7 °C)   Ht 5' 2" (1 575 m)   Wt 52 3 kg (115 lb 6 4 oz)   SpO2 98%   BMI 21 11 kg/m²     Physical Exam  Constitutional:       Appearance: Normal appearance  Eyes:      Extraocular Movements: Extraocular movements intact  Pupils: Pupils are equal, round, and reactive to light  Pulmonary:      Effort: Pulmonary effort is normal    Abdominal:      General: Abdomen is flat  Bowel sounds are normal  There is no distension  Palpations: Abdomen is soft  Tenderness: There is no abdominal tenderness  There is no guarding  Neurological:      General: No focal deficit present  Mental Status: She is alert and oriented to person, place, and time  Mental status is at baseline         Paolo Perez

## 2023-02-28 ENCOUNTER — HOSPITAL ENCOUNTER (EMERGENCY)
Facility: HOSPITAL | Age: 23
Discharge: HOME | End: 2023-02-28
Attending: EMERGENCY MEDICINE

## 2023-02-28 VITALS
WEIGHT: 115 LBS | TEMPERATURE: 98.1 F | BODY MASS INDEX: 21.16 KG/M2 | OXYGEN SATURATION: 99 % | SYSTOLIC BLOOD PRESSURE: 121 MMHG | HEIGHT: 62 IN | DIASTOLIC BLOOD PRESSURE: 77 MMHG | HEART RATE: 70 BPM | RESPIRATION RATE: 19 BRPM

## 2023-02-28 DIAGNOSIS — Z77.21 EXPOSURE TO BLOOD OR BODY FLUID: Primary | ICD-10-CM

## 2023-02-28 LAB — ALT SERPL W P-5'-P-CCNC: 10 U/L (ref 7–52)

## 2023-03-01 LAB
HBV SURFACE AB SER-ACNC: >1000 MIU/ML
HBV SURFACE AG SER QL: NORMAL
HCV AB SER QL: NORMAL
HIV 1+2 AB+HIV1 P24 AG SERPL QL IA: NORMAL
HIV 2 AB SERPL QL IA: NORMAL
HIV1 AB SERPL QL IA: NORMAL
HIV1 P24 AG SERPL QL IA: NORMAL
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
## 2023-03-17 ENCOUNTER — OFFICE VISIT (OUTPATIENT)
Dept: FAMILY MEDICINE CLINIC | Facility: CLINIC | Age: 23
End: 2023-03-17

## 2023-03-17 VITALS
SYSTOLIC BLOOD PRESSURE: 110 MMHG | TEMPERATURE: 98 F | HEART RATE: 79 BPM | BODY MASS INDEX: 20.76 KG/M2 | DIASTOLIC BLOOD PRESSURE: 62 MMHG | OXYGEN SATURATION: 98 % | HEIGHT: 62 IN | WEIGHT: 112.8 LBS

## 2023-03-17 DIAGNOSIS — R11.0 NAUSEA: ICD-10-CM

## 2023-03-17 DIAGNOSIS — R51.9 FREQUENT HEADACHES: Primary | ICD-10-CM

## 2023-03-17 DIAGNOSIS — R10.84 GENERALIZED ABDOMINAL PAIN: ICD-10-CM

## 2023-03-17 RX ORDER — PANTOPRAZOLE SODIUM 20 MG/1
20 TABLET, DELAYED RELEASE ORAL DAILY
Qty: 30 TABLET | Refills: 2 | Status: SHIPPED | OUTPATIENT
Start: 2023-03-17

## 2023-03-17 NOTE — PROGRESS NOTES
Name: Chico Layne      : 2000      MRN: [id number]  Encounter Provider: HARMAN Rivas  Encounter Date: 3/17/2023   Encounter department: [address] [place] [address],[address]     1  Frequent headaches    2  Generalized abdominal pain  -     pantoprazole (PROTONIX) 20 mg tablet; Take 1 tablet (20 mg total) by mouth daily    3  Nausea  -     pantoprazole (PROTONIX) 20 mg tablet; Take 1 tablet (20 mg total) by mouth daily       She will hold off on the propanolol at this time - will call if she would like to restart daily medication  Will have her continue with pantoprazole at this time  Last PAP 2022 - results sent to care gap team      Chlamydia screening on 2019  - results sent to care gap team      Subjective      Here today for a follow-up  She is with a hx of frequent headaches and also some abdominal pain  Abdominal pain associated with 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  She was started on a daily PPI and has continued with it  She works third shift and feels that her headaches that occur now are due to sleep that is not regular and also causing issues with her stomach  She is followed by GYN - received her DEPO shot from them  Review of Systems   Constitutional: Negative  HENT: Negative  Respiratory: Negative  Cardiovascular: Negative  Gastrointestinal: Positive for abdominal pain  Neurological: Positive for headaches  All other systems reviewed and are negative        Current Outpatient Medications on File Prior to Visit   Medication Sig   • famotidine (PEPCID) 20 mg tablet Take 1 tablet (20 mg total) by mouth every evening   • hydrOXYzine HCL (ATARAX) 10 mg tablet    • medroxyPROGESTERone (DEPO-PROVERA) 150 mg/mL injection    • sucralfate (CARAFATE) 1 g/10 mL suspension Take 10 mL (1 g total) by mouth 3 (three) times a day before meals   • [DISCONTINUED] pantoprazole (PROTONIX) 20 mg tablet Take 1 tablet (20 mg total) by mouth daily   • propranolol (INDERAL) 10 mg tablet Take 1 tablet (10 mg total) by mouth in the morning       Objective     /62 (BP Location: Left arm, Patient Position: Sitting, Cuff Size: Standard)   Pulse 79   Temp 98 °F (36 7 °C)   Ht 5' 2" (1 575 m)   Wt 51 2 kg (112 lb 12 8 oz)   SpO2 98%   BMI 20 63 kg/m²     Physical Exam  Constitutional:       Appearance: Normal appearance  Eyes:      Pupils: Pupils are equal, round, and reactive to light  Cardiovascular:      Rate and Rhythm: Normal rate and regular rhythm  Pulmonary:      Effort: Pulmonary effort is normal       Breath sounds: Normal breath sounds  Neurological:      Mental Status: She is alert  Psychiatric:         Mood and Affect: Mood normal          Behavior: Behavior normal          Thought Content:  Thought content normal          Judgment: Judgment normal        HARMAN Barlow

## 2023-03-20 ENCOUNTER — TELEPHONE (OUTPATIENT)
Dept: ADMINISTRATIVE | Facility: OTHER | Age: 23
End: 2023-03-20

## 2023-03-20 NOTE — TELEPHONE ENCOUNTER
Upon review of the In Basket request we were able to locate, review, and update the patient chart as requested for Pap Smear (HPV) aka Cervical Cancer Screening  There is no chlamydia screening with this DOS     Any additional questions or concerns should be emailed to the Practice Liaisons via the appropriate education email address, please do not reply via In Basket      Thank you  Anay Roland MA

## 2023-03-20 NOTE — TELEPHONE ENCOUNTER
----- Message from Avtar Gordon, 10 Maira Zhu sent at 3/17/2023  4:23 PM EDT -----  Regarding: Cervical cancer screening  Hello, our patient attached above has had a pap smear  completed/performed  Please assist in updating the patient chart by pulling the Care Everywhere (CE) document - located in the [facility] section under the 03/16/2023 clinical documents  The date of service for the pap is 04/25/2022  Thanks

## 2023-03-20 NOTE — TELEPHONE ENCOUNTER
----- Message from Artis Kinney sent at 3/17/2023  4:27 PM EDT -----  Regarding: chlamydia screening  Hello, our patient attached above has had chlamydia screening completed/performed  Please assist in updating the patient chart by pulling the Care Everywhere (CE) document  - [facility] document  The date of service is 04/14/2019  Thanks

## 2023-03-20 NOTE — TELEPHONE ENCOUNTER
Upon review of the In Basket request we were able to locate, review, and update the patient chart as requested for Pap Smear (HPV) aka Cervical Cancer Screening  Any additional questions or concerns should be emailed to the Practice Liaisons via the appropriate education email address, please do not reply via In Basket      Thank you  Cj Perez MA

## 2023-05-22 DIAGNOSIS — R51.9 FREQUENT HEADACHES: ICD-10-CM

## 2023-05-22 DIAGNOSIS — G43.809 OTHER MIGRAINE WITHOUT STATUS MIGRAINOSUS, NOT INTRACTABLE: ICD-10-CM

## 2023-05-22 RX ORDER — PROPRANOLOL HYDROCHLORIDE 10 MG/1
10 TABLET ORAL DAILY
Qty: 30 TABLET | Refills: 0 | Status: SHIPPED | OUTPATIENT
Start: 2023-05-22

## 2023-06-23 DIAGNOSIS — G43.809 OTHER MIGRAINE WITHOUT STATUS MIGRAINOSUS, NOT INTRACTABLE: ICD-10-CM

## 2023-06-23 DIAGNOSIS — R51.9 FREQUENT HEADACHES: ICD-10-CM

## 2023-06-26 RX ORDER — PROPRANOLOL HYDROCHLORIDE 10 MG/1
10 TABLET ORAL DAILY
Qty: 30 TABLET | Refills: 0 | Status: SHIPPED | OUTPATIENT
Start: 2023-06-26

## 2023-07-10 ENCOUNTER — OFFICE VISIT (OUTPATIENT)
Dept: FAMILY MEDICINE CLINIC | Facility: CLINIC | Age: 23
End: 2023-07-10
Payer: COMMERCIAL

## 2023-07-10 ENCOUNTER — APPOINTMENT (OUTPATIENT)
Dept: LAB | Facility: HOSPITAL | Age: 23
End: 2023-07-10
Payer: COMMERCIAL

## 2023-07-10 ENCOUNTER — HOSPITAL ENCOUNTER (OUTPATIENT)
Dept: RADIOLOGY | Facility: HOSPITAL | Age: 23
Discharge: HOME | End: 2023-07-10
Payer: COMMERCIAL

## 2023-07-10 VITALS
DIASTOLIC BLOOD PRESSURE: 62 MMHG | OXYGEN SATURATION: 97 % | TEMPERATURE: 98.4 F | HEART RATE: 84 BPM | BODY MASS INDEX: 20.61 KG/M2 | SYSTOLIC BLOOD PRESSURE: 103 MMHG | WEIGHT: 112 LBS | HEIGHT: 62 IN

## 2023-07-10 DIAGNOSIS — R10.84 GENERALIZED ABDOMINAL PAIN: ICD-10-CM

## 2023-07-10 DIAGNOSIS — K59.00 CONSTIPATION, UNSPECIFIED CONSTIPATION TYPE: ICD-10-CM

## 2023-07-10 DIAGNOSIS — R14.0 ABDOMINAL BLOATING: ICD-10-CM

## 2023-07-10 DIAGNOSIS — R11.2 NAUSEA AND VOMITING, UNSPECIFIED VOMITING TYPE: ICD-10-CM

## 2023-07-10 DIAGNOSIS — R10.84 GENERALIZED ABDOMINAL PAIN: Primary | ICD-10-CM

## 2023-07-10 PROCEDURE — 99213 OFFICE O/P EST LOW 20 MIN: CPT | Performed by: NURSE PRACTITIONER

## 2023-07-10 PROCEDURE — 74018 RADEX ABDOMEN 1 VIEW: CPT

## 2023-07-10 PROCEDURE — 36415 COLL VENOUS BLD VENIPUNCTURE: CPT

## 2023-07-10 PROCEDURE — 86231 EMA EACH IG CLASS: CPT

## 2023-07-10 PROCEDURE — 86008 ALLG SPEC IGE RECOMB EA: CPT

## 2023-07-10 PROCEDURE — 86258 DGP ANTIBODY EACH IG CLASS: CPT

## 2023-07-10 PROCEDURE — 82784 ASSAY IGA/IGD/IGG/IGM EACH: CPT

## 2023-07-10 PROCEDURE — 86364 TISS TRNSGLTMNASE EA IG CLAS: CPT

## 2023-07-10 PROCEDURE — 86003 ALLG SPEC IGE CRUDE XTRC EA: CPT

## 2023-07-10 PROCEDURE — 82785 ASSAY OF IGE: CPT

## 2023-07-10 RX ORDER — ONDANSETRON 4 MG/1
4 TABLET, ORALLY DISINTEGRATING ORAL EVERY 6 HOURS PRN
Qty: 20 TABLET | Refills: 0 | Status: SHIPPED | OUTPATIENT
Start: 2023-07-10

## 2023-07-10 NOTE — PROGRESS NOTES
Name: Elina Montes De Oca      : 2000      MRN: [id number]  Encounter Provider: HARMAN Cook  Encounter Date: 7/10/2023   Encounter department: [address] [place] [address]     1. Generalized abdominal pain  -     Celiac Disease Comprehensive Panel; Future  -     Food Allergy Profile; Future  -     XR abdomen 1 view kub; Future; Expected date: 07/10/2023    2. Abdominal bloating  -     Celiac Disease Comprehensive Panel; Future  -     Food Allergy Profile; Future  -     XR abdomen 1 view kub; Future; Expected date: 07/10/2023    3. Constipation, unspecified constipation type  -     Celiac Disease Comprehensive Panel; Future  -     Food Allergy Profile; Future  -     XR abdomen 1 view kub; Future; Expected date: 07/10/2023    4. Nausea and vomiting, unspecified vomiting type  -     ondansetron (ZOFRAN-ODT) 4 mg disintegrating tablet; Take 1 tablet (4 mg total) by mouth every 6 (six) hours as needed for nausea or vomiting       Blood work ordered, she would like to be tested for celiac as there is a family history. XR abdomen to assess for stool retention. Zofran prescribed for nausea. Subjective      Here today for an acute visit. Reports onset of abdominal bloating, constipation, vomiting starting 1-2 months ago. Notes she overall feels a lot of discomfort in her abdomen. Unable to link it to a specific food but does report a family hx of celiac diease. Review of Systems   Constitutional: Negative. Respiratory: Negative. Cardiovascular: Negative. Gastrointestinal: Positive for abdominal pain, constipation, nausea and vomiting. All other systems reviewed and are negative.       Current Outpatient Medications on File Prior to Visit   Medication Sig   • famotidine (PEPCID) 20 mg tablet Take 1 tablet (20 mg total) by mouth every evening   • hydrOXYzine HCL (ATARAX) 10 mg tablet    • medroxyPROGESTERone (DEPO-PROVERA) 150 mg/mL injection    • pantoprazole (PROTONIX) 20 mg tablet Take 1 tablet (20 mg total) by mouth daily   • propranolol (INDERAL) 10 mg tablet Take 1 tablet (10 mg total) by mouth in the morning   • sucralfate (CARAFATE) 1 g/10 mL suspension Take 10 mL (1 g total) by mouth 3 (three) times a day before meals       Objective     /62   Pulse 84   Temp 98.4 °F (36.9 °C)   Ht 5' 2" (1.575 m)   Wt 50.8 kg (112 lb)   SpO2 97%   BMI 20.49 kg/m²     Physical Exam  Constitutional:       Appearance: She is well-developed. Abdominal:      General: Bowel sounds are normal.      Tenderness: There is no abdominal tenderness. There is no guarding. Neurological:      Mental Status: She is alert and oriented to person, place, and time.        Landry Cintron

## 2023-07-11 LAB
A-LACTALB IGE QN: <0.1 KAU/I
ALMOND IGE QN: 0.42 KUA/I
ARA H6 PEANUT: <0.1 KUA/I
B-LACTOGLOB IGE QN: <0.1 KAU/I
CASEIN IGE QN: <0.1 KAU/I
CASHEW NUT IGE QN: <0.1 KUA/I
CODFISH IGE QN: 0.11 KUA/I
EGG WHITE IGE QN: 0.73 KUA/I
GLUTEN IGE QN: 0.21 KUA/I
HAZELNUT IGE QN: 0.37 KUA/L
MILK IGE QN: 0.5 KUA/I
OVALB IGE QN: 0.16 KAU/I
OVOMUCOID IGE QN: 0.61 KAU/I
PEANUT (RARA H) 1 IGE QN: <0.1 KUA/I
PEANUT (RARA H) 2 IGE QN: <0.1 KUA/I
PEANUT (RARA H) 3 IGE QN: <0.1 KUA/I
PEANUT (RARA H) 8 IGE QN: <0.1 KUA/I
PEANUT (RARA H) 9 IGE QN: 0.52 KUA/I
PEANUT IGE QN: 1.95 KUA/I
SALMON IGE QN: <0.1 KUA/I
SCALLOP IGE QN: 0.69 KUA/L
SESAME SEED IGE QN: 1.22 KUA/I
SHRIMP IGE QN: 2.46 KUA/L
SOYBEAN IGE QN: 0.31 KUA/I
TOTAL IGE SMQN RAST: 198 KU/L (ref 0–113)
TUNA IGE QN: <0.1 KUA/I
WALNUT IGE QN: 1.3 KUA/I
WHEAT IGE QN: 0.76 KUA/I

## 2023-07-12 DIAGNOSIS — Z91.012 EGG ALLERGY: Primary | ICD-10-CM

## 2023-07-12 DIAGNOSIS — Z91.018 MULTIPLE FOOD ALLERGIES: ICD-10-CM

## 2023-07-12 DIAGNOSIS — Z91.018 NUT ALLERGY: ICD-10-CM

## 2023-07-12 DIAGNOSIS — T78.40XS ALLERGIC REACTION, SEQUELA: ICD-10-CM

## 2023-07-12 LAB
ENDOMYSIUM IGA SER QL: NEGATIVE
GLIADIN PEPTIDE IGA SER-ACNC: 3 UNITS (ref 0–19)
GLIADIN PEPTIDE IGG SER-ACNC: 3 UNITS (ref 0–19)
IGA SERPL-MCNC: 188 MG/DL (ref 87–352)
TTG IGA SER-ACNC: <2 U/ML (ref 0–3)
TTG IGG SER-ACNC: 3 U/ML (ref 0–5)

## 2023-07-14 DIAGNOSIS — T78.40XA ALLERGIC REACTION, INITIAL ENCOUNTER: Primary | ICD-10-CM

## 2023-07-14 RX ORDER — EPINEPHRINE 0.3 MG/.3ML
0.3 INJECTION SUBCUTANEOUS ONCE
Qty: 0.6 ML | Refills: 3 | Status: SHIPPED | OUTPATIENT
Start: 2023-07-14 | End: 2023-07-14

## 2023-07-17 DIAGNOSIS — R11.0 NAUSEA: ICD-10-CM

## 2023-07-17 DIAGNOSIS — R10.84 GENERALIZED ABDOMINAL PAIN: ICD-10-CM

## 2023-07-17 RX ORDER — FAMOTIDINE 20 MG/1
TABLET, FILM COATED ORAL
Qty: 30 TABLET | Refills: 0 | Status: SHIPPED | OUTPATIENT
Start: 2023-07-17

## 2023-07-21 DIAGNOSIS — G43.809 OTHER MIGRAINE WITHOUT STATUS MIGRAINOSUS, NOT INTRACTABLE: ICD-10-CM

## 2023-07-21 DIAGNOSIS — R51.9 FREQUENT HEADACHES: ICD-10-CM

## 2023-07-24 RX ORDER — PROPRANOLOL HYDROCHLORIDE 10 MG/1
10 TABLET ORAL DAILY
Qty: 30 TABLET | Refills: 0 | Status: SHIPPED | OUTPATIENT
Start: 2023-07-24

## 2023-08-23 DIAGNOSIS — R51.9 FREQUENT HEADACHES: ICD-10-CM

## 2023-08-23 DIAGNOSIS — R10.84 GENERALIZED ABDOMINAL PAIN: ICD-10-CM

## 2023-08-23 DIAGNOSIS — R11.0 NAUSEA: ICD-10-CM

## 2023-08-23 DIAGNOSIS — G43.809 OTHER MIGRAINE WITHOUT STATUS MIGRAINOSUS, NOT INTRACTABLE: ICD-10-CM

## 2023-08-23 RX ORDER — PROPRANOLOL HYDROCHLORIDE 10 MG/1
10 TABLET ORAL DAILY
Qty: 30 TABLET | Refills: 1 | Status: SHIPPED | OUTPATIENT
Start: 2023-08-23

## 2023-08-23 RX ORDER — PANTOPRAZOLE SODIUM 20 MG/1
20 TABLET, DELAYED RELEASE ORAL DAILY
Qty: 30 TABLET | Refills: 1 | Status: SHIPPED | OUTPATIENT
Start: 2023-08-23

## 2023-08-23 RX ORDER — FAMOTIDINE 20 MG/1
20 TABLET, FILM COATED ORAL DAILY
Qty: 30 TABLET | Refills: 1 | Status: SHIPPED | OUTPATIENT
Start: 2023-08-23

## 2023-10-24 DIAGNOSIS — R51.9 FREQUENT HEADACHES: ICD-10-CM

## 2023-10-24 DIAGNOSIS — R10.84 GENERALIZED ABDOMINAL PAIN: ICD-10-CM

## 2023-10-24 DIAGNOSIS — R11.0 NAUSEA: ICD-10-CM

## 2023-10-24 DIAGNOSIS — G43.809 OTHER MIGRAINE WITHOUT STATUS MIGRAINOSUS, NOT INTRACTABLE: ICD-10-CM

## 2023-10-25 RX ORDER — PROPRANOLOL HYDROCHLORIDE 10 MG/1
10 TABLET ORAL DAILY
Qty: 30 TABLET | Refills: 0 | Status: SHIPPED | OUTPATIENT
Start: 2023-10-25

## 2023-10-25 RX ORDER — PANTOPRAZOLE SODIUM 20 MG/1
20 TABLET, DELAYED RELEASE ORAL DAILY
Qty: 30 TABLET | Refills: 0 | Status: SHIPPED | OUTPATIENT
Start: 2023-10-25

## 2023-10-25 RX ORDER — FAMOTIDINE 20 MG/1
20 TABLET, FILM COATED ORAL DAILY
Qty: 30 TABLET | Refills: 0 | Status: SHIPPED | OUTPATIENT
Start: 2023-10-25

## 2023-11-01 ENCOUNTER — HOSPITAL ENCOUNTER (EMERGENCY)
Facility: HOSPITAL | Age: 23
Discharge: HOME | End: 2023-11-01
Attending: EMERGENCY MEDICINE | Admitting: EMERGENCY MEDICINE
Payer: OTHER MISCELLANEOUS

## 2023-11-01 VITALS
TEMPERATURE: 98 F | BODY MASS INDEX: 20.12 KG/M2 | OXYGEN SATURATION: 98 % | WEIGHT: 110 LBS | SYSTOLIC BLOOD PRESSURE: 117 MMHG | DIASTOLIC BLOOD PRESSURE: 62 MMHG | RESPIRATION RATE: 18 BRPM | HEART RATE: 86 BPM
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
## 2023-11-01 LAB
ALT SERPL W P-5'-P-CCNC: 9 U/L (ref 7–52)
HBV SURFACE AB SER-ACNC: >500 MIU/ML
HBV SURFACE AG SER QL: NORMAL
HCV AB SER QL: NORMAL
HIV 1+2 AB+HIV1 P24 AG SERPL QL IA: NORMAL
HIV 2 AB SERPL QL IA: NORMAL
HIV1 AB SERPL QL IA: NORMAL
HIV1 P24 AG SERPL QL IA: NORMAL

## 2023-11-01 PROCEDURE — 99283 EMERGENCY DEPT VISIT LOW MDM: CPT

## 2023-11-01 PROCEDURE — 87340 HEPATITIS B SURFACE AG IA: CPT

## 2023-11-01 PROCEDURE — 84460 ALANINE AMINO (ALT) (SGPT): CPT

## 2023-11-01 PROCEDURE — 36415 COLL VENOUS BLD VENIPUNCTURE: CPT

## 2023-11-01 PROCEDURE — 86803 HEPATITIS C AB TEST: CPT

## 2023-11-01 PROCEDURE — 99284 EMERGENCY DEPT VISIT MOD MDM: CPT | Performed by: EMERGENCY MEDICINE

## 2023-11-01 PROCEDURE — 86706 HEP B SURFACE ANTIBODY: CPT

## 2023-11-01 PROCEDURE — 87389 HIV-1 AG W/HIV-1&-2 AB AG IA: CPT

## 2023-11-01 NOTE — ED PROVIDER NOTES
History  Chief Complaint   Patient presents with    Body Fluid Exposure     Was stuck with a dirty needle. 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 Patient states that she was injecting medication when the patient moved suddenly and she stuck the distal third digit of the left hand. She denies any pain or discomfort. Admits that there was minimal bleeding. Prior to Admission Medications   Prescriptions Last Dose Informant Patient Reported? Taking? EPINEPHrine (EpiPen 2-Uriel) 0.3 mg/0.3 mL SOAJ   No No   Sig: Inject 0.3 mL (0.3 mg total) into a muscle once for 1 dose   famotidine (PEPCID) 20 mg tablet   No No   Sig: Take 1 tablet (20 mg total) by mouth daily   hydrOXYzine HCL (ATARAX) 10 mg tablet   Yes No   medroxyPROGESTERone (DEPO-PROVERA) 150 mg/mL injection   Yes No   ondansetron (ZOFRAN-ODT) 4 mg disintegrating tablet   No No   Sig: Take 1 tablet (4 mg total) by mouth every 6 (six) hours as needed for nausea or vomiting   pantoprazole (PROTONIX) 20 mg tablet   No No   Sig: Take 1 tablet (20 mg total) by mouth daily   propranolol (INDERAL) 10 mg tablet   No No   Sig: Take 1 tablet (10 mg total) by mouth in the morning   sucralfate (CARAFATE) 1 g/10 mL suspension   No No   Sig: Take 10 mL (1 g total) by mouth 3 (three) times a day before meals      Facility-Administered Medications: None       Past Medical History:   Diagnosis Date    Known health problems: none        Past Surgical History:   Procedure Laterality Date    BUNIONECTOMY Bilateral        Family History   Problem Relation Age of Onset    No Known Problems Mother     No Known Problems Father      I have reviewed and agree with the history as documented.     E-Cigarette/Vaping    E-Cigarette Use Never User      E-Cigarette/Vaping Substances    Nicotine No     THC No     CBD No     Flavoring No     Other No     Unknown No      Social History     Tobacco Use    Smoking status: Never    Smokeless tobacco: Never   Vaping Use    Vaping Use: Never used   Substance Use Topics    Alcohol use: Yes     Comment: occational    Drug use: Not Currently       Review of Systems   Constitutional:  Negative for chills and fever. HENT:  Negative for ear pain and sore throat. Eyes:  Negative for pain and visual disturbance. Respiratory:  Negative for cough and shortness of breath. Cardiovascular:  Negative for chest pain and palpitations. Gastrointestinal:  Negative for abdominal pain and vomiting. Genitourinary:  Negative for dysuria and hematuria. Musculoskeletal:  Negative for arthralgias and back pain. Skin:  Positive for wound. Negative for color change and rash. Neurological:  Negative for seizures and syncope. All other systems reviewed and are negative. Physical Exam  Physical Exam  Vitals and nursing note reviewed. Constitutional:       Appearance: Normal appearance. She is well-developed and normal weight. HENT:      Head: Normocephalic and atraumatic. Right Ear: External ear normal.      Left Ear: External ear normal.      Nose: Nose normal.   Eyes:      Extraocular Movements: Extraocular movements intact. Cardiovascular:      Heart sounds: No murmur heard. Pulmonary:      Effort: Pulmonary effort is normal. No respiratory distress. Abdominal:      Tenderness: There is no abdominal tenderness. Musculoskeletal:         General: Signs of injury present. No swelling, tenderness or deformity. Normal range of motion. Cervical back: Normal range of motion and neck supple. No rigidity. Right lower leg: No edema. Left lower leg: No edema. Lymphadenopathy:      Cervical: No cervical adenopathy. Skin:     General: Skin is warm and dry. Findings: Bruising present. Comments: Small needlestick injury without bleeding to the left third finger distal medial aspect. Neurological:      General: No focal deficit present.       Mental Status: She is alert and oriented to person, place, and time. Mental status is at baseline. Psychiatric:         Mood and Affect: Mood normal.         Behavior: Behavior normal.         Vital Signs  ED Triage Vitals [11/01/23 0140]   Temperature Pulse Respirations Blood Pressure SpO2   98 °F (36.7 °C) 86 18 117/62 98 %      Temp src Heart Rate Source Patient Position - Orthostatic VS BP Location FiO2 (%)   -- Monitor Sitting Right arm --      Pain Score       No Pain           Vitals:    11/01/23 0140   BP: 117/62   Pulse: 86   Patient Position - Orthostatic VS: Sitting         Visual Acuity      ED Medications  Medications - No data to display    Diagnostic Studies  Results Reviewed       Procedure Component Value Units Date/Time    Hepatitis B surface antigen [[id number]] Collected: 11/01/23 0337    Lab Status: In process Specimen: Blood from Arm, Right Updated: 11/01/23 0341    HIV 1/2 AG/AB w Reflex [facility] for 2 yr old and above [[id number]] Collected: 11/01/23 0337    Lab Status: In process Specimen: Blood from Arm, Right Updated: 11/01/23 0341    Hepatitis C antibody [[id number]] Collected: 11/01/23 9075    Lab Status: In process Specimen: Blood from Arm, Right Updated: 11/01/23 0341    Hepatitis B surface antibody [[id number]] Collected: 11/01/23 4322    Lab Status: In process Specimen: Blood from Arm, Right Updated: 11/01/23 0341    ALT [[id number]] Collected: 11/01/23 0337    Lab Status: In process Specimen: Blood from Arm, Right Updated: 11/01/23 0341                   No orders to display              Procedures  Procedures         ED Course                               SBIRT 22yo+      Flowsheet Row Most Recent Value   Initial Alcohol Screen: US AUDIT-C     1. How often do you have a drink containing alcohol? 0 Filed at: 11/01/2023 0140   2. How many drinks containing alcohol do you have on a typical day you are drinking? 0 Filed at: 11/01/2023 0140   3a. Male UNDER 65:  How often do you have five or more drinks on one occasion? 0 Filed at: 11/01/2023 0140   3b. FEMALE Any Age, or MALE 65+: How often do you have 4 or more drinks on one occassion? 0 Filed at: 11/01/2023 0140   Audit-C Score 0 Filed at: 11/01/2023 0140                      Medical Decision Making  78-year-old female employee with needlestick injury. Needlestick injury protocol implemented. Patient is stable for discharge and return back to full duty and follow-up with employee health. Disposition  Final diagnoses:   Needlestick injury accident with exposure to body fluid     Time reflects when diagnosis was documented in both MDM as applicable and the Disposition within this note       Time User Action Codes Description Comment    11/1/2023  3:51 AM Thien Fox. 1XXA] Needlestick injury accident with exposure to body fluid           ED Disposition       ED Disposition   Discharge    Condition   Stable    Date/Time   Wed Nov 1, 2023 2301 [address] discharge to home/self care.                    Follow-up Information       Follow up With Specialties Details Why [facility] , [facility], Nurse Practitioner In 1 week As needed [address] [phone]              Discharge Medication List as of 11/1/2023  3:51 AM        CONTINUE these medications which have NOT CHANGED    Details   EPINEPHrine (EpiPen 2-Uriel) 0.3 mg/0.3 mL SOAJ Inject 0.3 mL (0.3 mg total) into a muscle once for 1 dose, Starting Fri 7/14/2023, Normal      famotidine (PEPCID) 20 mg tablet Take 1 tablet (20 mg total) by mouth daily, Starting Wed 10/25/2023, Normal      hydrOXYzine HCL (ATARAX) 10 mg tablet Historical Med      medroxyPROGESTERone (DEPO-PROVERA) 150 mg/mL injection Starting Mon 4/4/2022, Historical Med      ondansetron (ZOFRAN-ODT) 4 mg disintegrating tablet Take 1 tablet (4 mg total) by mouth every 6 (six) hours as needed for nausea or vomiting, Starting Mon 7/10/2023, Normal pantoprazole (PROTONIX) 20 mg tablet Take 1 tablet (20 mg total) by mouth daily, Starting Wed 10/25/2023, Normal      propranolol (INDERAL) 10 mg tablet Take 1 tablet (10 mg total) by mouth in the morning, Starting Wed 10/25/2023, Normal      sucralfate (CARAFATE) 1 g/10 mL suspension Take 10 mL (1 g total) by mouth 3 (three) times a day before meals, Starting Fri 11/4/2022, Normal             No discharge procedures on file.     PDMP Review       None            ED Provider  Electronically Signed by             Tracee Matt DO  11/01/23 0406

## 2023-11-08 ENCOUNTER — OFFICE VISIT (OUTPATIENT)
Dept: FAMILY MEDICINE CLINIC | Facility: CLINIC | Age: 23
End: 2023-11-08
Payer: COMMERCIAL

## 2023-11-08 VITALS
HEART RATE: 68 BPM | OXYGEN SATURATION: 98 % | HEIGHT: 62 IN | BODY MASS INDEX: 20.72 KG/M2 | SYSTOLIC BLOOD PRESSURE: 109 MMHG | WEIGHT: 112.6 LBS | DIASTOLIC BLOOD PRESSURE: 64 MMHG

## 2023-11-08 DIAGNOSIS — R51.9 FREQUENT HEADACHES: ICD-10-CM

## 2023-11-08 DIAGNOSIS — Z00.00 ANNUAL PHYSICAL EXAM: Primary | ICD-10-CM

## 2023-11-08 DIAGNOSIS — R10.9 ABDOMINAL CRAMPING: ICD-10-CM

## 2023-11-08 DIAGNOSIS — Z23 ENCOUNTER FOR IMMUNIZATION: ICD-10-CM

## 2023-11-08 DIAGNOSIS — R10.84 GENERALIZED ABDOMINAL PAIN: ICD-10-CM

## 2023-11-08 DIAGNOSIS — R14.0 BLOATING: ICD-10-CM

## 2023-11-08 DIAGNOSIS — E73.9 LACTOSE INTOLERANCE: ICD-10-CM

## 2023-11-08 PROCEDURE — 99213 OFFICE O/P EST LOW 20 MIN: CPT | Performed by: NURSE PRACTITIONER

## 2023-11-08 PROCEDURE — 90471 IMMUNIZATION ADMIN: CPT | Performed by: NURSE PRACTITIONER

## 2023-11-08 PROCEDURE — 90686 IIV4 VACC NO PRSV 0.5 ML IM: CPT | Performed by: NURSE PRACTITIONER

## 2023-11-08 PROCEDURE — 99395 PREV VISIT EST AGE 18-39: CPT | Performed by: NURSE PRACTITIONER

## 2023-11-08 RX ORDER — PREDNISONE 10 MG/1
10 TABLET ORAL DAILY
COMMUNITY
Start: 2023-08-23

## 2023-11-08 RX ORDER — DICYCLOMINE HYDROCHLORIDE 10 MG/1
10 CAPSULE ORAL 4 TIMES DAILY PRN
Qty: 45 CAPSULE | Refills: 1 | Status: SHIPPED | OUTPATIENT
Start: 2023-11-08

## 2023-11-08 NOTE — PROGRESS NOTES
ADULT ANNUAL [facility] PRIMARY CARE    NAME: Xenia Durant  AGE: 20 y.o. SEX: female  : 2000     DATE: 2023     Assessment and Plan:     Problem List Items Addressed This Visit        Other    Frequent headaches    Abdominal cramping     Bentyl prescribed for abdominal issues. FODMAP information and food choices provided for her to try. Relevant Medications    dicyclomine (BENTYL) 10 mg capsule    Lactose intolerance     Vegan dairy options discussed with her. Other Visit Diagnoses     Annual physical exam    -  Primary    Generalized abdominal pain        Bloating        Relevant Medications    dicyclomine (BENTYL) 10 mg capsule    Encounter for immunization        Relevant Orders    influenza vaccine, quadrivalent, 0.5 mL, preservative-free, for adult and pediatric patients 6 mos+ (AFLURIA, FLUARIX, FLULAVAL, FLUZONE)            Immunizations and preventive care screenings were discussed with patient today. Appropriate education was printed on patient's after visit summary. Counseling:  Ongoing GI issues. Depression Screening and Follow-up Plan: Patient was screened for depression during today's encounter. They screened negative with a PHQ-2 score of 2. Return in 6 months (on 2024). Chief Complaint:     Chief Complaint   Patient presents with   • Physical Exam   • Care Gap Request     Chlamydia  Screening  Fu vaccine- wants today       History of Present Illness:     Adult Annual Physical   Patient here for a comprehensive physical exam. The patient reports continuing GI issues - she is trying to avoid dairy due to a new allergy. Reports though of more frequent cramping and bloating. Diet and Physical Activity  Diet/Nutrition: well balanced diet and see above. .   Exercise: walking.       Depression Screening  PHQ-2/9 Depression Screening    Little interest or pleasure in doing things: 1 - several days  Feeling down, depressed, or hopeless: 1 - several days  Trouble falling or staying asleep, or sleeping too much: 2 - more than half the days  Feeling tired or having little energy: 2 - more than half the days  Poor appetite or overeatin - more than half the days  Feeling bad about yourself - or that you are a failure or have let yourself or your family down: 0 - not at all  Trouble concentrating on things, such as reading the newspaper or watching television: 0 - not at all  Moving or speaking so slowly that other people could have noticed. Or the opposite - being so fidgety or restless that you have been moving around a lot more than usual: 0 - not at all  Thoughts that you would be better off dead, or of hurting yourself in some way: 0 - not at all  PHQ-2 Score: 2  PHQ-2 Interpretation: Negative depression screen  PHQ-9 Score: 8   PHQ-9 Interpretation: Mild depression        General Health  Sleep: sleeps well and sleeps poorly. Hearing: normal - bilateral.  Vision: no vision problems. Dental: brushes teeth twice daily. /GYN Health  Last menstrual period: n/a on DEPO but will be switching to OCP in a few months  Contraceptive method: injectable contraception. History of STDs?: no.    Advanced Care Planning  Do you have an advanced directive? no  Do you have a durable medical power of ? no     Review of Systems:     Review of Systems   Constitutional: Negative. HENT: Negative. Respiratory: Negative. Cardiovascular: Negative. Gastrointestinal:         See HPI    Neurological: Negative. All other systems reviewed and are negative.      Past Medical History:     Past Medical History:   Diagnosis Date   • Known health problems: none       Past Surgical History:     Past Surgical History:   Procedure Laterality Date   • BUNIONECTOMY Bilateral       Social History:     Social History     Socioeconomic History   • Marital status: Single     Spouse name: None   • Number of children: None   • Years of education: None   • Highest education level: None   Occupational History   • None   Tobacco Use   • Smoking status: Never   • Smokeless tobacco: Never   Vaping Use   • Vaping Use: Never used   Substance and Sexual Activity   • Alcohol use: Yes     Comment: occational   • Drug use: Not Currently   • Sexual activity: Not Currently   Other Topics Concern   • None   Social History Narrative   • None     Social Determinants of Health     Financial Resource Strain: Not on file   Food Insecurity: Not on file   Transportation Needs: Not on file   Physical Activity: Not on file   Stress: Not on file   Social Connections: Not on file   Intimate Partner Violence: Not on file   Housing Stability: Not on file      Family History:     Family History   Problem Relation Age of Onset   • No Known Problems Mother    • No Known Problems Father       Current Medications:     Current Outpatient Medications   Medication Sig Dispense Refill   • dicyclomine (BENTYL) 10 mg capsule Take 1 capsule (10 mg total) by mouth 4 (four) times a day as needed (abdominal bloating and cramping) 45 capsule 1   • EPINEPHrine (EpiPen 2-Uriel) 0.3 mg/0.3 mL SOAJ Inject 0.3 mL (0.3 mg total) into a muscle once for 1 dose 0.6 mL 3   • famotidine (PEPCID) 20 mg tablet Take 1 tablet (20 mg total) by mouth daily 30 tablet 0   • hydrOXYzine HCL (ATARAX) 10 mg tablet      • medroxyPROGESTERone (DEPO-PROVERA) 150 mg/mL injection      • ondansetron (ZOFRAN-ODT) 4 mg disintegrating tablet Take 1 tablet (4 mg total) by mouth every 6 (six) hours as needed for nausea or vomiting 20 tablet 0   • pantoprazole (PROTONIX) 20 mg tablet Take 1 tablet (20 mg total) by mouth daily 30 tablet 0   • predniSONE 10 mg tablet Take 10 mg by mouth daily     • propranolol (INDERAL) 10 mg tablet Take 1 tablet (10 mg total) by mouth in the morning 30 tablet 0   • sucralfate (CARAFATE) 1 g/10 mL suspension Take 10 mL (1 g total) by mouth 3 (three) times a day before meals 414 mL 0     No current facility-administered medications for this visit. Allergies: Allergies   Allergen Reactions   • Nitrogen Mustard [Mechlorethamine] Anaphylaxis   • Lactose - Food Allergy GI Intolerance   • Sesame Oil - Food Allergy GI Intolerance   • Shellfish-Derived Products - Food Allergy Other (See Comments)     Scratchy throat      Physical Exam:     /64 (BP Location: Right arm, Patient Position: Sitting, Cuff Size: Standard)   Pulse 68   Ht 5' 2" (1.575 m)   Wt 51.1 kg (112 lb 9.6 oz)   SpO2 98%   BMI 20.59 kg/m²     Physical Exam  Vitals and nursing note reviewed. Constitutional:       General: She is not in acute distress. Appearance: Normal appearance. She is well-developed. HENT:      Head: Normocephalic and atraumatic. Eyes:      Conjunctiva/sclera: Conjunctivae normal.   Cardiovascular:      Rate and Rhythm: Normal rate and regular rhythm. Heart sounds: No murmur heard. No gallop. Pulmonary:      Effort: Pulmonary effort is normal. No respiratory distress. Breath sounds: Normal breath sounds. Abdominal:      Palpations: Abdomen is soft. Tenderness: There is no abdominal tenderness. Musculoskeletal:      Cervical back: Neck supple. Skin:     General: Skin is warm and dry. Neurological:      General: No focal deficit present. Mental Status: She is alert and oriented to person, place, and time. Mental status is at baseline. Psychiatric:         Mood and Affect: Mood normal.         Behavior: Behavior normal.         Thought Content:  Thought content normal.         Judgment: Judgment normal.          HARMAN Dejesus   [facility] PRIMARY [facility]

## 2023-11-09 NOTE — ASSESSMENT & PLAN NOTE
Bentyl prescribed for abdominal issues. FODMAP information and food choices provided for her to try.

## 2023-11-24 DIAGNOSIS — G43.809 OTHER MIGRAINE WITHOUT STATUS MIGRAINOSUS, NOT INTRACTABLE: ICD-10-CM

## 2023-11-24 DIAGNOSIS — R10.84 GENERALIZED ABDOMINAL PAIN: ICD-10-CM

## 2023-11-24 DIAGNOSIS — R11.0 NAUSEA: ICD-10-CM

## 2023-11-24 DIAGNOSIS — R51.9 FREQUENT HEADACHES: ICD-10-CM

## 2023-11-25 RX ORDER — PROPRANOLOL HYDROCHLORIDE 10 MG/1
10 TABLET ORAL DAILY
Qty: 30 TABLET | Refills: 0 | Status: SHIPPED | OUTPATIENT
Start: 2023-11-25

## 2023-11-25 RX ORDER — PANTOPRAZOLE SODIUM 20 MG/1
20 TABLET, DELAYED RELEASE ORAL DAILY
Qty: 30 TABLET | Refills: 0 | Status: SHIPPED | OUTPATIENT
Start: 2023-11-25

## 2023-11-25 RX ORDER — FAMOTIDINE 20 MG/1
20 TABLET, FILM COATED ORAL DAILY
Qty: 30 TABLET | Refills: 0 | Status: SHIPPED | OUTPATIENT
Start: 2023-11-25

## 2023-12-21 DIAGNOSIS — R11.0 NAUSEA: ICD-10-CM

## 2023-12-21 DIAGNOSIS — R10.84 GENERALIZED ABDOMINAL PAIN: ICD-10-CM

## 2023-12-21 DIAGNOSIS — G43.809 OTHER MIGRAINE WITHOUT STATUS MIGRAINOSUS, NOT INTRACTABLE: ICD-10-CM

## 2023-12-21 DIAGNOSIS — R51.9 FREQUENT HEADACHES: ICD-10-CM

## 2023-12-21 RX ORDER — PANTOPRAZOLE SODIUM 20 MG/1
20 TABLET, DELAYED RELEASE ORAL DAILY
Qty: 30 TABLET | Refills: 0 | Status: SHIPPED | OUTPATIENT
Start: 2023-12-21

## 2023-12-21 RX ORDER — PROPRANOLOL HYDROCHLORIDE 10 MG/1
10 TABLET ORAL DAILY
Qty: 30 TABLET | Refills: 0 | Status: SHIPPED | OUTPATIENT
Start: 2023-12-21

## 2023-12-21 RX ORDER — FAMOTIDINE 20 MG/1
20 TABLET, FILM COATED ORAL DAILY
Qty: 30 TABLET | Refills: 0 | Status: SHIPPED | OUTPATIENT
Start: 2023-12-21

## 2024-01-22 DIAGNOSIS — R51.9 FREQUENT HEADACHES: ICD-10-CM

## 2024-01-22 DIAGNOSIS — R10.84 GENERALIZED ABDOMINAL PAIN: ICD-10-CM

## 2024-01-22 DIAGNOSIS — G43.809 OTHER MIGRAINE WITHOUT STATUS MIGRAINOSUS, NOT INTRACTABLE: ICD-10-CM

## 2024-01-22 DIAGNOSIS — R11.0 NAUSEA: ICD-10-CM

## 2024-01-23 RX ORDER — PROPRANOLOL HYDROCHLORIDE 10 MG/1
10 TABLET ORAL DAILY
Qty: 30 TABLET | Refills: 0 | Status: SHIPPED | OUTPATIENT
Start: 2024-01-23

## 2024-01-23 RX ORDER — FAMOTIDINE 20 MG/1
20 TABLET, FILM COATED ORAL DAILY
Qty: 30 TABLET | Refills: 0 | Status: SHIPPED | OUTPATIENT
Start: 2024-01-23

## 2024-01-23 RX ORDER — PANTOPRAZOLE SODIUM 20 MG/1
20 TABLET, DELAYED RELEASE ORAL DAILY
Qty: 30 TABLET | Refills: 0 | Status: SHIPPED | OUTPATIENT
Start: 2024-01-23

## 2024-02-19 DIAGNOSIS — R11.0 NAUSEA: ICD-10-CM

## 2024-02-19 DIAGNOSIS — R51.9 FREQUENT HEADACHES: ICD-10-CM

## 2024-02-19 DIAGNOSIS — G43.809 OTHER MIGRAINE WITHOUT STATUS MIGRAINOSUS, NOT INTRACTABLE: ICD-10-CM

## 2024-02-19 DIAGNOSIS — R10.84 GENERALIZED ABDOMINAL PAIN: ICD-10-CM

## 2024-02-19 RX ORDER — PROPRANOLOL HYDROCHLORIDE 10 MG/1
10 TABLET ORAL DAILY
Qty: 30 TABLET | Refills: 0 | Status: SHIPPED | OUTPATIENT
Start: 2024-02-19

## 2024-02-19 RX ORDER — FAMOTIDINE 20 MG/1
20 TABLET, FILM COATED ORAL DAILY
Qty: 30 TABLET | Refills: 0 | Status: SHIPPED | OUTPATIENT
Start: 2024-02-19

## 2024-02-19 RX ORDER — PANTOPRAZOLE SODIUM 20 MG/1
20 TABLET, DELAYED RELEASE ORAL DAILY
Qty: 30 TABLET | Refills: 0 | Status: SHIPPED | OUTPATIENT
Start: 2024-02-19

## 2024-03-23 DIAGNOSIS — R51.9 FREQUENT HEADACHES: ICD-10-CM

## 2024-03-23 DIAGNOSIS — G43.809 OTHER MIGRAINE WITHOUT STATUS MIGRAINOSUS, NOT INTRACTABLE: ICD-10-CM

## 2024-03-23 DIAGNOSIS — R11.0 NAUSEA: ICD-10-CM

## 2024-03-23 DIAGNOSIS — R10.84 GENERALIZED ABDOMINAL PAIN: ICD-10-CM

## 2024-03-24 RX ORDER — FAMOTIDINE 20 MG/1
20 TABLET, FILM COATED ORAL DAILY
Qty: 30 TABLET | Refills: 0 | Status: SHIPPED | OUTPATIENT
Start: 2024-03-24

## 2024-03-24 RX ORDER — PROPRANOLOL HYDROCHLORIDE 10 MG/1
10 TABLET ORAL DAILY
Qty: 30 TABLET | Refills: 0 | Status: SHIPPED | OUTPATIENT
Start: 2024-03-24

## 2024-03-24 RX ORDER — PANTOPRAZOLE SODIUM 20 MG/1
20 TABLET, DELAYED RELEASE ORAL DAILY
Qty: 30 TABLET | Refills: 0 | Status: SHIPPED | OUTPATIENT
Start: 2024-03-24

## 2024-04-22 DIAGNOSIS — R10.84 GENERALIZED ABDOMINAL PAIN: ICD-10-CM

## 2024-04-22 DIAGNOSIS — R11.0 NAUSEA: ICD-10-CM

## 2024-04-22 DIAGNOSIS — R51.9 FREQUENT HEADACHES: ICD-10-CM

## 2024-04-22 DIAGNOSIS — G43.809 OTHER MIGRAINE WITHOUT STATUS MIGRAINOSUS, NOT INTRACTABLE: ICD-10-CM

## 2024-04-23 RX ORDER — FAMOTIDINE 20 MG/1
20 TABLET, FILM COATED ORAL DAILY
Qty: 90 TABLET | Refills: 1 | Status: SHIPPED | OUTPATIENT
Start: 2024-04-23

## 2024-04-23 RX ORDER — PANTOPRAZOLE SODIUM 20 MG/1
20 TABLET, DELAYED RELEASE ORAL DAILY
Qty: 90 TABLET | Refills: 1 | Status: SHIPPED | OUTPATIENT
Start: 2024-04-23

## 2024-04-23 RX ORDER — PROPRANOLOL HYDROCHLORIDE 10 MG/1
10 TABLET ORAL DAILY
Qty: 90 TABLET | Refills: 1 | Status: SHIPPED | OUTPATIENT
Start: 2024-04-23

## 2024-05-13 ENCOUNTER — OFFICE VISIT (OUTPATIENT)
Dept: FAMILY MEDICINE CLINIC | Facility: CLINIC | Age: 24
End: 2024-05-13
Payer: COMMERCIAL

## 2024-05-13 VITALS
HEIGHT: 62 IN | OXYGEN SATURATION: 99 % | HEART RATE: 68 BPM | SYSTOLIC BLOOD PRESSURE: 115 MMHG | WEIGHT: 112.4 LBS | DIASTOLIC BLOOD PRESSURE: 62 MMHG | BODY MASS INDEX: 20.68 KG/M2

## 2024-05-13 DIAGNOSIS — E73.9 LACTOSE INTOLERANCE: ICD-10-CM

## 2024-05-13 DIAGNOSIS — R51.9 FREQUENT HEADACHES: Primary | ICD-10-CM

## 2024-05-13 DIAGNOSIS — F41.9 ANXIETY: ICD-10-CM

## 2024-05-13 DIAGNOSIS — Z00.8 OTHER SPECIFIED GENERAL MEDICAL EXAMINATION: ICD-10-CM

## 2024-05-13 DIAGNOSIS — G43.809 OTHER MIGRAINE WITHOUT STATUS MIGRAINOSUS, NOT INTRACTABLE: ICD-10-CM

## 2024-05-13 DIAGNOSIS — R10.9 ABDOMINAL CRAMPING: ICD-10-CM

## 2024-05-13 DIAGNOSIS — R11.2 NAUSEA AND VOMITING, UNSPECIFIED VOMITING TYPE: ICD-10-CM

## 2024-05-13 PROBLEM — G43.909 MIGRAINE: Status: ACTIVE | Noted: 2024-05-13

## 2024-05-13 PROBLEM — K21.9 GASTROESOPHAGEAL REFLUX DISEASE WITHOUT ESOPHAGITIS: Status: ACTIVE | Noted: 2024-05-13

## 2024-05-13 PROCEDURE — 99214 OFFICE O/P EST MOD 30 MIN: CPT | Performed by: NURSE PRACTITIONER

## 2024-05-13 RX ORDER — HYDROXYZINE HYDROCHLORIDE 10 MG/1
10 TABLET, FILM COATED ORAL EVERY 6 HOURS PRN
Qty: 45 TABLET | Refills: 1 | Status: SHIPPED | OUTPATIENT
Start: 2024-05-13

## 2024-05-13 RX ORDER — PROPRANOLOL HYDROCHLORIDE 20 MG/1
20 TABLET ORAL DAILY
Qty: 90 TABLET | Refills: 1 | Status: SHIPPED | OUTPATIENT
Start: 2024-05-13

## 2024-05-13 RX ORDER — NORETHINDRONE ACETATE AND ETHINYL ESTRADIOL, ETHINYL ESTRADIOL AND FERROUS FUMARATE 1MG-10(24)
KIT ORAL
COMMUNITY
Start: 2024-04-07

## 2024-05-13 RX ORDER — ONDANSETRON 4 MG/1
4 TABLET, ORALLY DISINTEGRATING ORAL EVERY 6 HOURS PRN
Qty: 20 TABLET | Refills: 0 | Status: SHIPPED | OUTPATIENT
Start: 2024-05-13

## 2024-05-13 NOTE — PROGRESS NOTES
Name: Camila Alvarado      : 2000      MRN: [id number]  Encounter Provider: HARMAN Dejesus  Encounter Date: 2024   Encounter department: [facility] PRIMARY [facility]ARE    Assessment & Plan     1. Frequent headaches  -     propranolol (INDERAL) 20 mg tablet; Take 1 tablet (20 mg total) by mouth in the morning    2. Abdominal cramping    3. Lactose intolerance  Assessment & Plan:  Avoids dairy, also with a soy allergy.        4. Other migraine without status migrainosus, not intractable  Assessment & Plan:  Will increase her inderal to 20 mg daily.  She would like to continue with once a day due to nausea/vomiting if second dose taken later in the day.      Orders:  -     propranolol (INDERAL) 20 mg tablet; Take 1 tablet (20 mg total) by mouth in the morning    5. Nausea and vomiting, unspecified vomiting type  -     ondansetron (ZOFRAN-ODT) 4 mg disintegrating tablet; Take 1 tablet (4 mg total) by mouth every 6 (six) hours as needed for nausea or vomiting    6. Anxiety  -     hydrOXYzine HCL (ATARAX) 10 mg tablet; Take 1 tablet (10 mg total) by mouth every 6 (six) hours as needed for itching    7. Other specified general medical examination  -     Lipid panel; Future  -     Hemoglobin A1C; Future    Zofran refill for nausea/vomiting from medication.  Hydroxyzine refilled to help with sleep with anxiety.      Screening labs ordered.        Subjective       Here today for a follow-up. Hx of migraines/frequent headaches.  Placed on a beta blocker 6 months ago with much improvement.  Reports more recently that her headaches have started to return.      Hx of lactose and soy allergy, also with walnuts.  Carries epi pen for walnut and seafood allergies.           Review of Systems   Constitutional: Negative.    HENT: Negative.     Respiratory: Negative.     Cardiovascular: Negative.    Gastrointestinal: Negative.    Neurological:  Positive for headaches.   All other systems reviewed and are  "negative.      Current Outpatient Medications on File Prior to Visit   Medication Sig    dicyclomine (BENTYL) 10 mg capsule Take 1 capsule (10 mg total) by mouth 4 (four) times a day as needed (abdominal bloating and cramping)    EPINEPHrine (EpiPen 2-Uriel) 0.3 mg/0.3 mL SOAJ Inject 0.3 mL (0.3 mg total) into a muscle once for 1 dose    famotidine (PEPCID) 20 mg tablet Take 1 tablet (20 mg total) by mouth daily    Lo Loestrin Fe 1 MG-10 MCG / 10 MCG TABS     pantoprazole (PROTONIX) 20 mg tablet Take 1 tablet (20 mg total) by mouth daily    predniSONE 10 mg tablet Take 10 mg by mouth daily    propranolol (INDERAL) 10 mg tablet Take 1 tablet (10 mg total) by mouth in the morning    sucralfate (CARAFATE) 1 g/10 mL suspension Take 10 mL (1 g total) by mouth 3 (three) times a day before meals    [DISCONTINUED] hydrOXYzine HCL (ATARAX) 10 mg tablet     [DISCONTINUED] ondansetron (ZOFRAN-ODT) 4 mg disintegrating tablet Take 1 tablet (4 mg total) by mouth every 6 (six) hours as needed for nausea or vomiting    [DISCONTINUED] medroxyPROGESTERone (DEPO-PROVERA) 150 mg/mL injection        Objective     /62 (BP Location: Left arm, Patient Position: Sitting, Cuff Size: Standard)   Pulse 68   Ht 5' 2\" (1.575 m)   Wt 51 kg (112 lb 6.4 oz)   SpO2 99%   BMI 20.56 kg/m²     Physical Exam  Vitals reviewed.   Pulmonary:      Effort: Pulmonary effort is normal.   Neurological:      General: No focal deficit present.      Mental Status: She is alert. Mental status is at baseline.   Psychiatric:         Mood and Affect: Mood normal.         Behavior: Behavior normal.       HARMAN Dejesus    "

## 2024-05-13 NOTE — ASSESSMENT & PLAN NOTE
Will increase her inderal to 20 mg daily.  She would like to continue with once a day due to nausea/vomiting if second dose taken later in the day.

## 2024-11-20 DIAGNOSIS — R10.84 GENERALIZED ABDOMINAL PAIN: ICD-10-CM

## 2024-11-20 DIAGNOSIS — R11.0 NAUSEA: ICD-10-CM

## 2024-11-20 RX ORDER — PANTOPRAZOLE SODIUM 20 MG/1
20 TABLET, DELAYED RELEASE ORAL DAILY
Qty: 90 TABLET | Refills: 1 | Status: SHIPPED | OUTPATIENT
Start: 2024-11-20 | End: 2024-11-22 | Stop reason: SDUPTHER

## 2024-11-20 RX ORDER — FAMOTIDINE 20 MG/1
20 TABLET, FILM COATED ORAL DAILY
Qty: 90 TABLET | Refills: 1 | Status: SHIPPED | OUTPATIENT
Start: 2024-11-20

## 2024-11-22 ENCOUNTER — OFFICE VISIT (OUTPATIENT)
Dept: FAMILY MEDICINE CLINIC | Facility: CLINIC | Age: 24
End: 2024-11-22
Payer: COMMERCIAL

## 2024-11-22 VITALS
OXYGEN SATURATION: 99 % | BODY MASS INDEX: 21.16 KG/M2 | SYSTOLIC BLOOD PRESSURE: 110 MMHG | HEART RATE: 68 BPM | DIASTOLIC BLOOD PRESSURE: 80 MMHG | HEIGHT: 62 IN | WEIGHT: 115 LBS

## 2024-11-22 DIAGNOSIS — G43.909 MIGRAINE WITHOUT STATUS MIGRAINOSUS, NOT INTRACTABLE, UNSPECIFIED MIGRAINE TYPE: ICD-10-CM

## 2024-11-22 DIAGNOSIS — R10.84 GENERALIZED ABDOMINAL PAIN: ICD-10-CM

## 2024-11-22 DIAGNOSIS — R11.0 NAUSEA: ICD-10-CM

## 2024-11-22 DIAGNOSIS — K21.9 GASTROESOPHAGEAL REFLUX DISEASE WITHOUT ESOPHAGITIS: ICD-10-CM

## 2024-11-22 DIAGNOSIS — Z00.00 ANNUAL PHYSICAL EXAM: Primary | ICD-10-CM

## 2024-11-22 PROBLEM — R51.9 FREQUENT HEADACHES: Status: RESOLVED | Noted: 2023-03-17 | Resolved: 2024-11-22

## 2024-11-22 PROCEDURE — 99395 PREV VISIT EST AGE 18-39: CPT | Performed by: NURSE PRACTITIONER

## 2024-11-22 RX ORDER — KETOROLAC TROMETHAMINE 10 MG/1
10 TABLET, FILM COATED ORAL EVERY 6 HOURS PRN
Qty: 15 TABLET | Refills: 0 | Status: SHIPPED | OUTPATIENT
Start: 2024-11-22

## 2024-11-22 RX ORDER — PANTOPRAZOLE SODIUM 20 MG/1
20 TABLET, DELAYED RELEASE ORAL DAILY
Qty: 90 TABLET | Refills: 3 | Status: SHIPPED | OUTPATIENT
Start: 2024-11-22

## 2024-11-22 NOTE — PATIENT INSTRUCTIONS
"Patient Education     Routine physical for adults   The Basics   Written by the doctors and editors at [facility]   What is a physical? -- A physical is a routine visit, or \"check-up,\" with your doctor. You might also hear it called a \"wellness visit\" or \"preventive visit.\"  During each visit, the doctor will:   Ask about your physical and mental health   Ask about your habits, behaviors, and lifestyle   Do an exam   Give you vaccines if needed   Talk to you about any medicines you take   Give advice about your health   Answer your questions  Getting regular check-ups is an important part of taking care of your health. It can help your doctor find and treat any problems you have. But it's also important for preventing health problems.  A routine physical is different from a \"sick visit.\" A sick visit is when you see a doctor because of a health concern or problem. Since physicals are scheduled ahead of time, you can think about what you want to ask the doctor.  How often should I get a physical? -- It depends on your age and health. In general, for people age 21 years and older:   If you are younger than 50 years, you might be able to get a physical every 3 years.   If you are 50 years or older, your doctor might recommend a physical every year.  If you have an ongoing health condition, like diabetes or high blood pressure, your doctor will probably want to see you more often.  What happens during a physical? -- In general, each visit will include:   Physical exam - The doctor or nurse will check your height, weight, heart rate, and blood pressure. They will also look at your eyes and ears. They will ask about how you are feeling and whether you have any symptoms that bother you.   Medicines - It's a good idea to bring a list of all the medicines you take to each doctor visit. Your doctor will talk to you about your medicines and answer any questions. Tell them if you are having any side effects that bother you. You " "should also tell them if you are having trouble paying for any of your medicines.   Habits and behaviors - This includes:   Your diet   Your exercise habits   Whether you smoke, drink alcohol, or use drugs   Whether you are sexually active   Whether you feel safe at home  Your doctor will talk to you about things you can do to improve your health and lower your risk of health problems. They will also offer help and support. For example, if you want to quit smoking, they can give you advice and might prescribe medicines. If you want to improve your diet or get more physical activity, they can help you with this, too.   Lab tests, if needed - The tests you get will depend on your age and situation. For example, your doctor might want to check your:   Cholesterol   Blood sugar   Iron level   Vaccines - The recommended vaccines will depend on your age, health, and what vaccines you already had. Vaccines are very important because they can prevent certain serious or deadly infections.   Discussion of screening - \"Screening\" means checking for diseases or other health problems before they cause symptoms. Your doctor can recommend screening based on your age, risk, and preferences. This might include tests to check for:   Cancer, such as breast, prostate, cervical, ovarian, colorectal, prostate, lung, or skin cancer   Sexually transmitted infections, such as chlamydia and gonorrhea   Mental health conditions like depression and anxiety  Your doctor will talk to you about the different types of screening tests. They can help you decide which screenings to have. They can also explain what the results might mean.   Answering questions - The physical is a good time to ask the doctor or nurse questions about your health. If needed, they can refer you to other doctors or specialists, too.  Adults older than 65 years often need other care, too. As you get older, your doctor will talk to you about:   How to prevent falling at " home   Hearing or vision tests   Memory testing   How to take your medicines safely   Making sure that you have the help and support you need at home  All topics are updated as new evidence becomes available and our peer review process is complete.  This topic retrieved from [employer] on: May 02, 2024.  Topic 418257 Version 1.0  Release: 32.4.3 - C32.122  © 2024 [employer] and/or its affiliates. All rights reserved.  Consumer Information Use and Disclaimer   Disclaimer: This generalized information is a limited summary of diagnosis, treatment, and/or medication information. It is not meant to be comprehensive and should be used as a tool to help the user understand and/or assess potential diagnostic and treatment options. It does NOT include all information about conditions, treatments, medications, side effects, or risks that may apply to a specific patient. It is not intended to be medical advice or a substitute for the medical advice, diagnosis, or treatment of a health care provider based on the health care provider's examination and assessment of a patient's specific and unique circumstances. Patients must speak with a health care provider for complete information about their health, medical questions, and treatment options, including any risks or benefits regarding use of medications. This information does not endorse any treatments or medications as safe, effective, or approved for treating a specific patient. [employer] and its affiliates disclaim any warranty or liability relating to this information or the use thereof.The use of this information is governed by the Terms of Use, available at https://www.[employer].com/en/know/clinical-effectiveness-terms. 2024© [employer], Inc. and its affiliates and/or licensors. All rights reserved.  Copyright   © 2024 [employer] and/or its affiliates. All rights reserved.

## 2024-11-22 NOTE — ASSESSMENT & PLAN NOTE
Same - with the inderal - occurring every day.    Weaning schedule provided.    Will have her try toradol PO at onset of migraines.      Orders:    ketorolac (TORADOL) 10 mg tablet; Take 1 tablet (10 mg total) by mouth every 6 (six) hours as needed for moderate pain